# Patient Record
Sex: FEMALE | Race: BLACK OR AFRICAN AMERICAN | NOT HISPANIC OR LATINO | Employment: FULL TIME | URBAN - METROPOLITAN AREA
[De-identification: names, ages, dates, MRNs, and addresses within clinical notes are randomized per-mention and may not be internally consistent; named-entity substitution may affect disease eponyms.]

---

## 2019-07-18 RX ORDER — ESCITALOPRAM OXALATE 20 MG/1
20 TABLET ORAL DAILY
Qty: 30 TABLET | Refills: 3 | Status: SHIPPED | OUTPATIENT
Start: 2019-07-18 | End: 2019-07-29 | Stop reason: SDUPTHER

## 2019-07-29 RX ORDER — CETIRIZINE HYDROCHLORIDE 10 MG/1
10 TABLET ORAL DAILY
Qty: 30 TABLET | Refills: 3 | Status: SHIPPED | OUTPATIENT
Start: 2019-07-29 | End: 2019-12-13 | Stop reason: SDUPTHER

## 2019-07-29 RX ORDER — ATORVASTATIN CALCIUM 20 MG/1
20 TABLET, FILM COATED ORAL DAILY
Qty: 30 TABLET | Refills: 3 | Status: SHIPPED | OUTPATIENT
Start: 2019-07-29 | End: 2019-12-09 | Stop reason: SDUPTHER

## 2019-07-29 RX ORDER — BUMETANIDE 2 MG/1
2 TABLET ORAL DAILY
Qty: 30 TABLET | Refills: 1 | Status: SHIPPED | OUTPATIENT
Start: 2019-07-29 | End: 2019-10-21 | Stop reason: SDUPTHER

## 2019-07-29 RX ORDER — ESCITALOPRAM OXALATE 20 MG/1
20 TABLET ORAL DAILY
Qty: 30 TABLET | Refills: 3 | Status: SHIPPED | OUTPATIENT
Start: 2019-07-29 | End: 2020-01-14 | Stop reason: SDUPTHER

## 2019-07-29 RX ORDER — OMEPRAZOLE 40 MG/1
40 CAPSULE, DELAYED RELEASE ORAL DAILY
Qty: 30 CAPSULE | Refills: 3 | Status: SHIPPED | OUTPATIENT
Start: 2019-07-29 | End: 2019-11-21 | Stop reason: SDUPTHER

## 2019-07-29 RX ORDER — LOSARTAN POTASSIUM 100 MG/1
100 TABLET ORAL DAILY
Qty: 30 TABLET | Refills: 3 | Status: SHIPPED | OUTPATIENT
Start: 2019-07-29 | End: 2020-01-14 | Stop reason: SDUPTHER

## 2019-07-29 RX ORDER — TRIAZOLAM 0.12 MG/1
0.12 TABLET ORAL NIGHTLY PRN
Qty: 30 TABLET | Refills: 0 | Status: SHIPPED | OUTPATIENT
Start: 2019-07-29 | End: 2019-09-17 | Stop reason: SDUPTHER

## 2019-08-08 RX ORDER — LOSARTAN POTASSIUM 50 MG/1
50 TABLET ORAL 2 TIMES DAILY
Qty: 60 TABLET | Refills: 0 | Status: SHIPPED | OUTPATIENT
Start: 2019-08-08 | End: 2019-11-19 | Stop reason: SDUPTHER

## 2019-09-17 DIAGNOSIS — G47.00 INSOMNIA, UNSPECIFIED TYPE: Primary | ICD-10-CM

## 2019-09-19 RX ORDER — TRIAZOLAM 0.12 MG/1
TABLET ORAL
Qty: 30 TABLET | Refills: 0 | Status: SHIPPED | OUTPATIENT
Start: 2019-09-19 | End: 2020-01-14 | Stop reason: SDUPTHER

## 2019-09-23 RX ORDER — TRIAZOLAM 0.12 MG/1
TABLET ORAL
Qty: 30 TABLET | Refills: 0 | OUTPATIENT
Start: 2019-09-23

## 2019-09-24 ENCOUNTER — TELEPHONE (OUTPATIENT)
Dept: FAMILY MEDICINE CLINIC | Facility: CLINIC | Age: 64
End: 2019-09-24

## 2019-10-21 RX ORDER — BUMETANIDE 2 MG/1
TABLET ORAL
Qty: 30 TABLET | Refills: 0 | Status: SHIPPED | OUTPATIENT
Start: 2019-10-21 | End: 2020-01-14 | Stop reason: SDUPTHER

## 2019-11-04 ENCOUNTER — TELEPHONE (OUTPATIENT)
Dept: FAMILY MEDICINE CLINIC | Facility: CLINIC | Age: 64
End: 2019-11-04

## 2019-11-04 RX ORDER — POTASSIUM CHLORIDE 20 MEQ/1
TABLET, EXTENDED RELEASE ORAL
Qty: 30 TABLET | Refills: 1 | Status: SHIPPED | OUTPATIENT
Start: 2019-11-04 | End: 2020-01-13

## 2019-11-19 RX ORDER — LOSARTAN POTASSIUM 50 MG/1
50 TABLET ORAL 2 TIMES DAILY
Qty: 60 TABLET | Refills: 0 | Status: SHIPPED | OUTPATIENT
Start: 2019-11-19 | End: 2020-01-14

## 2019-11-21 ENCOUNTER — TELEPHONE (OUTPATIENT)
Dept: FAMILY MEDICINE CLINIC | Facility: CLINIC | Age: 64
End: 2019-11-21

## 2019-11-21 RX ORDER — OMEPRAZOLE 40 MG/1
40 CAPSULE, DELAYED RELEASE ORAL DAILY
Qty: 30 CAPSULE | Refills: 3 | Status: SHIPPED | OUTPATIENT
Start: 2019-11-21 | End: 2019-11-21 | Stop reason: SDUPTHER

## 2019-11-21 RX ORDER — OMEPRAZOLE 40 MG/1
40 CAPSULE, DELAYED RELEASE ORAL DAILY
Qty: 30 CAPSULE | Refills: 3 | Status: SHIPPED | OUTPATIENT
Start: 2019-11-21 | End: 2020-01-14 | Stop reason: SDUPTHER

## 2019-12-09 ENCOUNTER — TELEPHONE (OUTPATIENT)
Dept: FAMILY MEDICINE CLINIC | Facility: CLINIC | Age: 64
End: 2019-12-09

## 2019-12-09 RX ORDER — ATORVASTATIN CALCIUM 20 MG/1
20 TABLET, FILM COATED ORAL DAILY
Qty: 30 TABLET | Refills: 3 | Status: SHIPPED | OUTPATIENT
Start: 2019-12-09 | End: 2019-12-12 | Stop reason: SDUPTHER

## 2019-12-09 NOTE — TELEPHONE ENCOUNTER
Sent in atorvastatin to [facility] pharmacy listed in chart.

## 2019-12-09 NOTE — TELEPHONE ENCOUNTER
Pt is requesting a refill on the following medication,   atorvastatin (LIPITOR) 20 MG tablet        Sig: Take 1 tablet by mouth Daily.          NEXT APPT 1/14/2020

## 2019-12-12 ENCOUNTER — TELEPHONE (OUTPATIENT)
Dept: FAMILY MEDICINE CLINIC | Facility: CLINIC | Age: 64
End: 2019-12-12

## 2019-12-12 DIAGNOSIS — E78.5 HYPERLIPIDEMIA, UNSPECIFIED HYPERLIPIDEMIA TYPE: Primary | ICD-10-CM

## 2019-12-12 RX ORDER — ATORVASTATIN CALCIUM 20 MG/1
20 TABLET, FILM COATED ORAL DAILY
Qty: 30 TABLET | Refills: 1 | Status: SHIPPED | OUTPATIENT
Start: 2019-12-12 | End: 2019-12-12 | Stop reason: SDUPTHER

## 2019-12-12 RX ORDER — ATORVASTATIN CALCIUM 20 MG/1
20 TABLET, FILM COATED ORAL DAILY
Qty: 90 TABLET | Refills: 1 | Status: SHIPPED | OUTPATIENT
Start: 2019-12-12 | End: 2020-01-14 | Stop reason: SDUPTHER

## 2019-12-12 NOTE — TELEPHONE ENCOUNTER
Patient states [facility] sent a request for her atorvastatin and they told her it was denied because Dr Kebede doesn't know who she is.  She called kind of upset and wants to know why.  I tried to explain that she hasn't been to the office yet and that's probably why, but she didn't really want to hear that.  She does have an appointment on 01/14.  She wants to know if she could get enough to her appointment and if someone could call her please.  Her phone number is [phone].  Pharmacy is [facility].

## 2019-12-16 RX ORDER — CETIRIZINE HYDROCHLORIDE 10 MG/1
TABLET ORAL
Qty: 30 TABLET | Refills: 3 | Status: SHIPPED | OUTPATIENT
Start: 2019-12-16 | End: 2020-01-14 | Stop reason: SDUPTHER

## 2020-01-11 RX ORDER — VARENICLINE TARTRATE 1 MG/1
1 TABLET, FILM COATED ORAL 2 TIMES DAILY
COMMUNITY
End: 2020-01-14 | Stop reason: SDUPTHER

## 2020-01-13 RX ORDER — POTASSIUM CHLORIDE 20 MEQ/1
TABLET, EXTENDED RELEASE ORAL
Qty: 30 TABLET | Refills: 1 | Status: SHIPPED | OUTPATIENT
Start: 2020-01-13 | End: 2020-01-14 | Stop reason: SDUPTHER

## 2020-01-14 ENCOUNTER — OFFICE VISIT (OUTPATIENT)
Dept: FAMILY MEDICINE CLINIC | Facility: CLINIC | Age: 65
End: 2020-01-14

## 2020-01-14 VITALS
DIASTOLIC BLOOD PRESSURE: 82 MMHG | HEART RATE: 83 BPM | TEMPERATURE: 98.6 F | BODY MASS INDEX: 23.56 KG/M2 | WEIGHT: 141.38 LBS | SYSTOLIC BLOOD PRESSURE: 130 MMHG | OXYGEN SATURATION: 98 % | HEIGHT: 65 IN

## 2020-01-14 DIAGNOSIS — J30.1 ALLERGIC RHINITIS DUE TO POLLEN, UNSPECIFIED SEASONALITY: ICD-10-CM

## 2020-01-14 DIAGNOSIS — Z00.00 HEALTHCARE MAINTENANCE: Primary | ICD-10-CM

## 2020-01-14 DIAGNOSIS — K21.9 GASTROESOPHAGEAL REFLUX DISEASE WITHOUT ESOPHAGITIS: ICD-10-CM

## 2020-01-14 DIAGNOSIS — G47.00 INSOMNIA, UNSPECIFIED TYPE: ICD-10-CM

## 2020-01-14 DIAGNOSIS — I10 ESSENTIAL HYPERTENSION: ICD-10-CM

## 2020-01-14 DIAGNOSIS — F32.A DEPRESSION, UNSPECIFIED DEPRESSION TYPE: ICD-10-CM

## 2020-01-14 DIAGNOSIS — Z11.59 ENCOUNTER FOR HEPATITIS C SCREENING TEST FOR LOW RISK PATIENT: ICD-10-CM

## 2020-01-14 DIAGNOSIS — F17.200 SMOKING: ICD-10-CM

## 2020-01-14 DIAGNOSIS — Z01.419 ENCOUNTER FOR GYNECOLOGICAL EXAMINATION WITHOUT ABNORMAL FINDING: ICD-10-CM

## 2020-01-14 DIAGNOSIS — R60.9 EDEMA, UNSPECIFIED TYPE: ICD-10-CM

## 2020-01-14 DIAGNOSIS — E78.5 HYPERLIPIDEMIA, UNSPECIFIED HYPERLIPIDEMIA TYPE: ICD-10-CM

## 2020-01-14 PROCEDURE — 90732 PPSV23 VACC 2 YRS+ SUBQ/IM: CPT | Performed by: FAMILY MEDICINE

## 2020-01-14 PROCEDURE — 90471 IMMUNIZATION ADMIN: CPT | Performed by: FAMILY MEDICINE

## 2020-01-14 PROCEDURE — 90686 IIV4 VACC NO PRSV 0.5 ML IM: CPT | Performed by: FAMILY MEDICINE

## 2020-01-14 PROCEDURE — 90472 IMMUNIZATION ADMIN EACH ADD: CPT | Performed by: FAMILY MEDICINE

## 2020-01-14 PROCEDURE — 99386 PREV VISIT NEW AGE 40-64: CPT | Performed by: FAMILY MEDICINE

## 2020-01-14 RX ORDER — ATORVASTATIN CALCIUM 20 MG/1
20 TABLET, FILM COATED ORAL DAILY
Qty: 90 TABLET | Refills: 3 | Status: SHIPPED | OUTPATIENT
Start: 2020-01-14 | End: 2020-04-27

## 2020-01-14 RX ORDER — LOSARTAN POTASSIUM 100 MG/1
100 TABLET ORAL DAILY
Qty: 30 TABLET | Refills: 3 | Status: SHIPPED | OUTPATIENT
Start: 2020-01-14 | End: 2020-04-27 | Stop reason: SDUPTHER

## 2020-01-14 RX ORDER — OMEPRAZOLE 40 MG/1
40 CAPSULE, DELAYED RELEASE ORAL DAILY
Qty: 30 CAPSULE | Refills: 3 | Status: SHIPPED | OUTPATIENT
Start: 2020-01-14 | End: 2020-06-15

## 2020-01-14 RX ORDER — ESCITALOPRAM OXALATE 20 MG/1
20 TABLET ORAL DAILY
Qty: 30 TABLET | Refills: 3 | Status: SHIPPED | OUTPATIENT
Start: 2020-01-14 | End: 2020-04-27 | Stop reason: SDUPTHER

## 2020-01-14 RX ORDER — POTASSIUM CHLORIDE 20 MEQ/1
20 TABLET, EXTENDED RELEASE ORAL DAILY
Qty: 30 TABLET | Refills: 1 | Status: SHIPPED | OUTPATIENT
Start: 2020-01-14 | End: 2020-04-08 | Stop reason: SDUPTHER

## 2020-01-14 RX ORDER — NITROGLYCERIN 0.4 MG/1
TABLET SUBLINGUAL
COMMUNITY
Start: 2013-03-28 | End: 2020-01-14

## 2020-01-14 RX ORDER — CETIRIZINE HYDROCHLORIDE 10 MG/1
10 TABLET ORAL DAILY
Qty: 30 TABLET | Refills: 11 | Status: SHIPPED | OUTPATIENT
Start: 2020-01-14 | End: 2021-01-26

## 2020-01-14 RX ORDER — AMOXICILLIN 500 MG
2 CAPSULE ORAL 2 TIMES DAILY WITH MEALS
Qty: 120 CAPSULE | Refills: 11 | Status: SHIPPED | OUTPATIENT
Start: 2020-01-14 | End: 2022-09-21 | Stop reason: SDUPTHER

## 2020-01-14 RX ORDER — AMOXICILLIN 500 MG
2 CAPSULE ORAL DAILY
COMMUNITY
Start: 2014-06-16 | End: 2020-01-14 | Stop reason: SDUPTHER

## 2020-01-14 RX ORDER — BUMETANIDE 2 MG/1
2 TABLET ORAL DAILY
Qty: 30 TABLET | Refills: 11 | Status: SHIPPED | OUTPATIENT
Start: 2020-01-14 | End: 2020-04-08

## 2020-01-14 RX ORDER — VARENICLINE TARTRATE 1 MG/1
1 TABLET, FILM COATED ORAL 2 TIMES DAILY
Qty: 60 TABLET | Refills: 4 | Status: SHIPPED | OUTPATIENT
Start: 2020-01-14 | End: 2020-08-13

## 2020-01-14 NOTE — PATIENT INSTRUCTIONS
Health Maintenance, Female  Adopting a healthy lifestyle and getting preventive care can go a long way to promote health and wellness. Talk with your health care provider about what schedule of regular examinations is right for you. This is a good chance for you to check in with your provider about disease prevention and staying healthy.  In between checkups, there are plenty of things you can do on your own. Experts have done a lot of research about which lifestyle changes and preventive measures are most likely to keep you healthy. Ask your health care provider for more information.  Weight and diet  Eat a healthy diet  · Be sure to include plenty of vegetables, fruits, low-fat dairy products, and lean protein.  · Do not eat a lot of foods high in solid fats, added sugars, or salt.  · Get regular exercise. This is one of the most important things you can do for your health.  ? Most adults should exercise for at least 150 minutes each week. The exercise should increase your heart rate and make you sweat (moderate-intensity exercise).  ? Most adults should also do strengthening exercises at least twice a week. This is in addition to the moderate-intensity exercise.  Maintain a healthy weight  · Body mass index (BMI) is a measurement that can be used to identify possible weight problems. It estimates body fat based on height and weight. Your health care provider can help determine your BMI and help you achieve or maintain a healthy weight.  · For females 20 years of age and older:  ? A BMI below 18.5 is considered underweight.  ? A BMI of 18.5 to 24.9 is normal.  ? A BMI of 25 to 29.9 is considered overweight.  ? A BMI of 30 and above is considered obese.  Watch levels of cholesterol and blood lipids  · You should start having your blood tested for lipids and cholesterol at 20 years of age, then have this test every 5 years.  · You may need to have your cholesterol levels checked more often if:  ? Your lipid or  cholesterol levels are high.  ? You are older than 50 years of age.  ? You are at high risk for heart disease.  Cancer screening  Lung Cancer  · Lung cancer screening is recommended for adults 55-80 years old who are at high risk for lung cancer because of a history of smoking.  · A yearly low-dose CT scan of the lungs is recommended for people who:  ? Currently smoke.  ? Have quit within the past 15 years.  ? Have at least a 30-pack-year history of smoking. A pack year is smoking an average of one pack of cigarettes a day for 1 year.  · Yearly screening should continue until it has been 15 years since you quit.  · Yearly screening should stop if you develop a health problem that would prevent you from having lung cancer treatment.  Breast Cancer  · Practice breast self-awareness. This means understanding how your breasts normally appear and feel.  · It also means doing regular breast self-exams. Let your health care provider know about any changes, no matter how small.  · If you are in your 20s or 30s, you should have a clinical breast exam (CBE) by a health care provider every 1-3 years as part of a regular health exam.  · If you are 40 or older, have a CBE every year. Also consider having a breast X-ray (mammogram) every year.  · If you have a family history of breast cancer, talk to your health care provider about genetic screening.  · If you are at high risk for breast cancer, talk to your health care provider about having an MRI and a mammogram every year.  · Breast cancer gene (BRCA) assessment is recommended for women who have family members with BRCA-related cancers. BRCA-related cancers include:  ? Breast.  ? Ovarian.  ? Tubal.  ? Peritoneal cancers.  · Results of the assessment will determine the need for genetic counseling and BRCA1 and BRCA2 testing.  Cervical Cancer  Your health care provider may recommend that you be screened regularly for cancer of the pelvic organs (ovaries, uterus, and vagina).  This screening involves a pelvic examination, including checking for microscopic changes to the surface of your cervix (Pap test). You may be encouraged to have this screening done every 3 years, beginning at age 21.  · For women ages 30-65, health care providers may recommend pelvic exams and Pap testing every 3 years, or they may recommend the Pap and pelvic exam, combined with testing for human papilloma virus (HPV), every 5 years. Some types of HPV increase your risk of cervical cancer. Testing for HPV may also be done on women of any age with unclear Pap test results.  · Other health care providers may not recommend any screening for nonpregnant women who are considered low risk for pelvic cancer and who do not have symptoms. Ask your health care provider if a screening pelvic exam is right for you.  · If you have had past treatment for cervical cancer or a condition that could lead to cancer, you need Pap tests and screening for cancer for at least 20 years after your treatment. If Pap tests have been discontinued, your risk factors (such as having a new sexual partner) need to be reassessed to determine if screening should resume. Some women have medical problems that increase the chance of getting cervical cancer. In these cases, your health care provider may recommend more frequent screening and Pap tests.  Colorectal Cancer  · This type of cancer can be detected and often prevented.  · Routine colorectal cancer screening usually begins at 50 years of age and continues through 75 years of age.  · Your health care provider may recommend screening at an earlier age if you have risk factors for colon cancer.  · Your health care provider may also recommend using home test kits to check for hidden blood in the stool.  · A small camera at the end of a tube can be used to examine your colon directly (sigmoidoscopy or colonoscopy). This is done to check for the earliest forms of colorectal cancer.  · Routine  screening usually begins at age 50.  · Direct examination of the colon should be repeated every 5-10 years through 75 years of age. However, you may need to be screened more often if early forms of precancerous polyps or small growths are found.  Skin Cancer  · Check your skin from head to toe regularly.  · Tell your health care provider about any new moles or changes in moles, especially if there is a change in a mole's shape or color.  · Also tell your health care provider if you have a mole that is larger than the size of a pencil eraser.  · Always use sunscreen. Apply sunscreen liberally and repeatedly throughout the day.  · Protect yourself by wearing long sleeves, pants, a wide-brimmed hat, and sunglasses whenever you are outside.  Heart disease, diabetes, and high blood pressure  · High blood pressure causes heart disease and increases the risk of stroke. High blood pressure is more likely to develop in:  ? People who have blood pressure in the high end of the normal range (130-139/85-89 mm Hg).  ? People who are overweight or obese.  ? People who are .  · If you are 18-39 years of age, have your blood pressure checked every 3-5 years. If you are 40 years of age or older, have your blood pressure checked every year. You should have your blood pressure measured twice--once when you are at a hospital or clinic, and once when you are not at a hospital or clinic. Record the average of the two measurements. To check your blood pressure when you are not at a hospital or clinic, you can use:  ? An automated blood pressure machine at a pharmacy.  ? A home blood pressure monitor.  · If you are between 55 years and 79 years old, ask your health care provider if you should take aspirin to prevent strokes.  · Have regular diabetes screenings. This involves taking a blood sample to check your fasting blood sugar level.  ? If you are at a normal weight and have a low risk for diabetes, have this test once  every three years after 45 years of age.  ? If you are overweight and have a high risk for diabetes, consider being tested at a younger age or more often.  Preventing infection  Hepatitis B  · If you have a higher risk for hepatitis B, you should be screened for this virus. You are considered at high risk for hepatitis B if:  ? You were born in a country where hepatitis B is common. Ask your health care provider which countries are considered high risk.  ? Your parents were born in a high-risk country, and you have not been immunized against hepatitis B (hepatitis B vaccine).  ? You have HIV or AIDS.  ? You use needles to inject street drugs.  ? You live with someone who has hepatitis B.  ? You have had sex with someone who has hepatitis B.  ? You get hemodialysis treatment.  ? You take certain medicines for conditions, including cancer, organ transplantation, and autoimmune conditions.  Hepatitis C  · Blood testing is recommended for:  ? Everyone born from 1945 through 1965.  ? Anyone with known risk factors for hepatitis C.  Sexually transmitted infections (STIs)  · You should be screened for sexually transmitted infections (STIs) including gonorrhea and chlamydia if:  ? You are sexually active and are younger than 24 years of age.  ? You are older than 24 years of age and your health care provider tells you that you are at risk for this type of infection.  ? Your sexual activity has changed since you were last screened and you are at an increased risk for chlamydia or gonorrhea. Ask your health care provider if you are at risk.  · If you do not have HIV, but are at risk, it may be recommended that you take a prescription medicine daily to prevent HIV infection. This is called pre-exposure prophylaxis (PrEP). You are considered at risk if:  ? You are sexually active and do not regularly use condoms or know the HIV status of your partner(s).  ? You take drugs by injection.  ? You are sexually active with a partner  who has HIV.  Talk with your health care provider about whether you are at high risk of being infected with HIV. If you choose to begin PrEP, you should first be tested for HIV. You should then be tested every 3 months for as long as you are taking PrEP.  Pregnancy  · If you are premenopausal and you may become pregnant, ask your health care provider about preconception counseling.  · If you may become pregnant, take 400 to 800 micrograms (mcg) of folic acid every day.  · If you want to prevent pregnancy, talk to your health care provider about birth control (contraception).  Osteoporosis and menopause  · Osteoporosis is a disease in which the bones lose minerals and strength with aging. This can result in serious bone fractures. Your risk for osteoporosis can be identified using a bone density scan.  · If you are 65 years of age or older, or if you are at risk for osteoporosis and fractures, ask your health care provider if you should be screened.  · Ask your health care provider whether you should take a calcium or vitamin D supplement to lower your risk for osteoporosis.  · Menopause may have certain physical symptoms and risks.  · Hormone replacement therapy may reduce some of these symptoms and risks.  Talk to your health care provider about whether hormone replacement therapy is right for you.  Follow these instructions at home:  · Schedule regular health, dental, and eye exams.  · Stay current with your immunizations.  · Do not use any tobacco products including cigarettes, chewing tobacco, or electronic cigarettes.  · If you are pregnant, do not drink alcohol.  · If you are breastfeeding, limit how much and how often you drink alcohol.  · Limit alcohol intake to no more than 1 drink per day for nonpregnant women. One drink equals 12 ounces of beer, 5 ounces of wine, or 1½ ounces of hard liquor.  · Do not use street drugs.  · Do not share needles.  · Ask your health care provider for help if you need support  or information about quitting drugs.  · Tell your health care provider if you often feel depressed.  · Tell your health care provider if you have ever been abused or do not feel safe at home.  This information is not intended to replace advice given to you by your health care provider. Make sure you discuss any questions you have with your health care provider.  Document Released: 07/02/2012 Document Revised: 05/25/2017 Document Reviewed: 09/20/2016  [employer] Interactive Patient Education © 2019 [employer]

## 2020-01-14 NOTE — PROGRESS NOTES
"Karen Arora is here today for an annual physical exam.     Eating a healthy diet. Exercising routinely. Yes  no periods. Wears seat belt. Feels safe at home.   Sexual activity: yes  Birth control:n/a  Pregnancy:2  Sexual and gender orientation:heterosexual    PHQ-2 Depression Screening  Little interest or pleasure in doing things? 0   Feeling down, depressed, or hopeless? 0   PHQ-2 Total Score 0         I have reviewed the patient's medical, family, and social history in detail and updated the computerized patient record.    Screening history:  Colonoscopy - 2014  Mammogram- due, Pap/pelvic - today  Metabolic - NL    Health Maintenance   Topic Date Due   • TDAP/TD VACCINES (1 - Tdap) 08/24/1968   • PNEUMOCOCCAL VACCINE (19-64 MEDIUM RISK) (1 of 1 - PPSV23) 08/24/1976   • ZOSTER VACCINE (1 of 2) 08/24/2007   • HEPATITIS C SCREENING  07/18/2019   • INFLUENZA VACCINE  08/01/2019   • LIPID PANEL  11/04/2019   • ANNUAL PHYSICAL  01/15/2021   • MAMMOGRAM  01/28/2021   • PAP SMEAR  01/14/2023   • COLONOSCOPY  07/18/2024       Review of Systems   All other systems reviewed and are negative.      /82   Pulse 83   Temp 98.6 °F (37 °C)   Ht 165.1 cm (65\")   Wt 64.1 kg (141 lb 6 oz)   SpO2 98%   BMI 23.53 kg/m²      Physical Exam      Physical Exam   Constitutional: She appears well-developed and well-nourished. No distress.   HENT:   Head: Normocephalic.   Eyes: Pupils are equal, round, and reactive to light.   Cardiovascular: Normal rate and regular rhythm.   Pulmonary/Chest: Effort normal and breath sounds normal. No respiratory distress. She has no wheezes.   Abdominal: Soft.   Genitourinary: Vagina normal.   Musculoskeletal: Normal range of motion.   Skin: Skin is warm. She is not diaphoretic.   Nursing note and vitals reviewed.    No visits with results within 2 Week(s) from this visit.   Latest known visit with results is:   Abstract on 10/17/2019   Component Date Value Ref Range Status   • HM " Mammogram 01/28/2019 See Report   Final   • HM Colonoscopy 07/18/2014 See Report   Final         Current Outpatient Medications:   •  atorvastatin (LIPITOR) 20 MG tablet, Take 1 tablet by mouth Daily., Disp: 90 tablet, Rfl: 1  •  bumetanide (BUMEX) 2 MG tablet, TAKE ONE TABLET BY MOUTH DAILY, Disp: 30 tablet, Rfl: 0  •  cetirizine (zyrTEC) 10 MG tablet, TAKE ONE TABLET BY MOUTH DAILY, Disp: 30 tablet, Rfl: 3  •  escitalopram (LEXAPRO) 20 MG tablet, Take 1 tablet by mouth Daily., Disp: 30 tablet, Rfl: 3  •  losartan (COZAAR) 100 MG tablet, Take 1 tablet by mouth Daily., Disp: 30 tablet, Rfl: 3  •  Omega-3 Fatty Acids (FISH OIL) 1200 MG capsule capsule, Take 2 capsules by mouth Daily., Disp: , Rfl:   •  omeprazole (priLOSEC) 40 MG capsule, Take 1 capsule by mouth Daily., Disp: 30 capsule, Rfl: 3  •  potassium chloride (K-DUR,KLOR-CON) 20 MEQ CR tablet, TAKE ONE TABLET BY MOUTH ONCE DAILY, Disp: 30 tablet, Rfl: 1  •  triazolam (HALCION) 0.125 MG tablet, TAKE ONE TABLET BY MOUTH ONCE NIGHTLY AS NEEDED FOR SLEEP, Disp: 30 tablet, Rfl: 0  •  varenicline (CHANTIX CONTINUING MONTH BRANDON) 1 MG tablet, Take 1 mg by mouth 2 (Two) Times a Day., Disp: , Rfl:     Karen was seen today for annual exam and gynecologic exam.    Diagnoses and all orders for this visit:    Healthcare maintenance  -     Fluarix/Fluzone/Afluria Quad>6 Months  -     Comprehensive Metabolic Panel  -     Lipid Panel  -     CBC & Differential  -     Mammo Screening Bilateral With CAD; Future    Encounter for gynecological examination without abnormal finding      Preventive guidance given.  Encourage healthy diet and exercise.  Encourage patient to stay up to date on screening examinations as indicated based on age and risk factors. F/U yearly.

## 2020-01-15 LAB
ALBUMIN SERPL-MCNC: 4.6 G/DL (ref 3.5–5.2)
ALBUMIN/GLOB SERPL: 1.8 G/DL
ALP SERPL-CCNC: 58 U/L (ref 39–117)
ALT SERPL-CCNC: 21 U/L (ref 1–33)
AST SERPL-CCNC: 22 U/L (ref 1–32)
BASOPHILS # BLD AUTO: 0.06 10*3/MM3 (ref 0–0.2)
BASOPHILS NFR BLD AUTO: 0.9 % (ref 0–1.5)
BILIRUB SERPL-MCNC: 0.2 MG/DL (ref 0.2–1.2)
BUN SERPL-MCNC: 17 MG/DL (ref 8–23)
BUN/CREAT SERPL: 19.1 (ref 7–25)
CALCIUM SERPL-MCNC: 10 MG/DL (ref 8.6–10.5)
CHLORIDE SERPL-SCNC: 99 MMOL/L (ref 98–107)
CHOLEST SERPL-MCNC: 226 MG/DL (ref 0–200)
CO2 SERPL-SCNC: 27.2 MMOL/L (ref 22–29)
CREAT SERPL-MCNC: 0.89 MG/DL (ref 0.57–1)
EOSINOPHIL # BLD AUTO: 0.07 10*3/MM3 (ref 0–0.4)
EOSINOPHIL NFR BLD AUTO: 1.1 % (ref 0.3–6.2)
ERYTHROCYTE [DISTWIDTH] IN BLOOD BY AUTOMATED COUNT: 12.2 % (ref 12.3–15.4)
GLOBULIN SER CALC-MCNC: 2.5 GM/DL
GLUCOSE SERPL-MCNC: 97 MG/DL (ref 65–99)
HCT VFR BLD AUTO: 37.3 % (ref 34–46.6)
HCV AB S/CO SERPL IA: 0.3 S/CO RATIO (ref 0–0.9)
HDLC SERPL-MCNC: 86 MG/DL (ref 40–60)
HGB BLD-MCNC: 12 G/DL (ref 12–15.9)
IMM GRANULOCYTES # BLD AUTO: 0.02 10*3/MM3 (ref 0–0.05)
IMM GRANULOCYTES NFR BLD AUTO: 0.3 % (ref 0–0.5)
LDLC SERPL CALC-MCNC: 123 MG/DL (ref 0–100)
LYMPHOCYTES # BLD AUTO: 1.82 10*3/MM3 (ref 0.7–3.1)
LYMPHOCYTES NFR BLD AUTO: 27.5 % (ref 19.6–45.3)
MCH RBC QN AUTO: 30.2 PG (ref 26.6–33)
MCHC RBC AUTO-ENTMCNC: 32.2 G/DL (ref 31.5–35.7)
MCV RBC AUTO: 94 FL (ref 79–97)
MONOCYTES # BLD AUTO: 0.74 10*3/MM3 (ref 0.1–0.9)
MONOCYTES NFR BLD AUTO: 11.2 % (ref 5–12)
NEUTROPHILS # BLD AUTO: 3.9 10*3/MM3 (ref 1.7–7)
NEUTROPHILS NFR BLD AUTO: 59 % (ref 42.7–76)
NRBC BLD AUTO-RTO: 0.2 /100 WBC (ref 0–0.2)
PLATELET # BLD AUTO: 321 10*3/MM3 (ref 140–450)
POTASSIUM SERPL-SCNC: 4.8 MMOL/L (ref 3.5–5.2)
PROT SERPL-MCNC: 7.1 G/DL (ref 6–8.5)
RBC # BLD AUTO: 3.97 10*6/MM3 (ref 3.77–5.28)
SODIUM SERPL-SCNC: 139 MMOL/L (ref 136–145)
TRIGL SERPL-MCNC: 84 MG/DL (ref 0–150)
VLDLC SERPL CALC-MCNC: 16.8 MG/DL
WBC # BLD AUTO: 6.61 10*3/MM3 (ref 3.4–10.8)

## 2020-02-03 ENCOUNTER — TELEPHONE (OUTPATIENT)
Dept: FAMILY MEDICINE CLINIC | Facility: CLINIC | Age: 65
End: 2020-02-03

## 2020-02-03 DIAGNOSIS — G47.00 INSOMNIA, UNSPECIFIED TYPE: ICD-10-CM

## 2020-02-03 NOTE — TELEPHONE ENCOUNTER
Patient states that Dr Kebede increased her Triazolam to .25 mg.  The instructions states to take half a tablet and not a whole tablet which she says she is supposed to be taking.  She says [facility] won't refill the medications since the instructions have changed.  She wants to know if you could send to [facility] on file please.  Phone number is [phone].

## 2020-02-26 ENCOUNTER — HOSPITAL ENCOUNTER (OUTPATIENT)
Dept: MAMMOGRAPHY | Facility: HOSPITAL | Age: 65
Discharge: HOME | End: 2020-02-26

## 2020-02-26 ENCOUNTER — HOSPITAL ENCOUNTER (OUTPATIENT)
Dept: MAMMOGRAPHY | Facility: HOSPITAL | Age: 65
Discharge: HOME | End: 2020-02-26
Admitting: FAMILY MEDICINE

## 2020-02-26 DIAGNOSIS — Z00.00 HEALTHCARE MAINTENANCE: ICD-10-CM

## 2020-02-26 PROCEDURE — 77067 SCR MAMMO BI INCL CAD: CPT

## 2020-02-26 PROCEDURE — 77063 BREAST TOMOSYNTHESIS BI: CPT

## 2020-03-05 DIAGNOSIS — G47.00 INSOMNIA, UNSPECIFIED TYPE: ICD-10-CM

## 2020-03-05 NOTE — TELEPHONE ENCOUNTER
Thyroid was not checked.  We can call in Triazolam, but it may not be covered by insurance since it is not due, will be cash pay.

## 2020-03-05 NOTE — TELEPHONE ENCOUNTER
Patient called and states she left her triazolam in [place] when she went there for her brother's .  She said the pharmacy told her she can't get a refill for 15 more days.  She wants to know if you could send another prescription in for her please.  Also, she wants to know if her thyroid levels were checked when she was in last. She has a copy of her labs, but can't tell if it was done or not?  Her phone number is [phone].

## 2020-03-06 DIAGNOSIS — R53.83 OTHER FATIGUE: Primary | ICD-10-CM

## 2020-03-07 DIAGNOSIS — G47.00 INSOMNIA, UNSPECIFIED TYPE: ICD-10-CM

## 2020-03-26 ENCOUNTER — TELEPHONE (OUTPATIENT)
Dept: FAMILY MEDICINE CLINIC | Facility: CLINIC | Age: 65
End: 2020-03-26

## 2020-03-26 DIAGNOSIS — G47.00 INSOMNIA, UNSPECIFIED TYPE: Primary | ICD-10-CM

## 2020-03-26 RX ORDER — TEMAZEPAM 30 MG/1
30 CAPSULE ORAL NIGHTLY PRN
Qty: 30 CAPSULE | Refills: 1 | Status: SHIPPED | OUTPATIENT
Start: 2020-03-26 | End: 2020-06-30 | Stop reason: SDUPTHER

## 2020-04-07 LAB
T4 FREE SERPL-MCNC: 0.92 NG/DL (ref 0.93–1.7)
TSH SERPL DL<=0.005 MIU/L-ACNC: 0.36 UIU/ML (ref 0.27–4.2)

## 2020-04-08 DIAGNOSIS — I10 ESSENTIAL HYPERTENSION: ICD-10-CM

## 2020-04-08 DIAGNOSIS — R60.9 EDEMA, UNSPECIFIED TYPE: ICD-10-CM

## 2020-04-08 RX ORDER — POTASSIUM CHLORIDE 20 MEQ/1
20 TABLET, EXTENDED RELEASE ORAL DAILY
Qty: 30 TABLET | Refills: 1 | Status: SHIPPED | OUTPATIENT
Start: 2020-04-08 | End: 2020-06-15 | Stop reason: SDUPTHER

## 2020-04-08 RX ORDER — BUMETANIDE 2 MG/1
TABLET ORAL
Qty: 15 TABLET | Refills: 0 | Status: SHIPPED | OUTPATIENT
Start: 2020-04-08 | End: 2020-04-27 | Stop reason: SDUPTHER

## 2020-04-25 DIAGNOSIS — E78.5 HYPERLIPIDEMIA, UNSPECIFIED HYPERLIPIDEMIA TYPE: ICD-10-CM

## 2020-04-27 DIAGNOSIS — I10 ESSENTIAL HYPERTENSION: ICD-10-CM

## 2020-04-27 DIAGNOSIS — F32.A DEPRESSION, UNSPECIFIED DEPRESSION TYPE: ICD-10-CM

## 2020-04-27 DIAGNOSIS — R60.9 EDEMA, UNSPECIFIED TYPE: ICD-10-CM

## 2020-04-27 RX ORDER — BUMETANIDE 2 MG/1
2 TABLET ORAL DAILY
Qty: 15 TABLET | Refills: 0 | Status: SHIPPED | OUTPATIENT
Start: 2020-04-27 | End: 2020-06-08

## 2020-04-27 RX ORDER — ATORVASTATIN CALCIUM 20 MG/1
TABLET, FILM COATED ORAL
Qty: 90 TABLET | Refills: 0 | Status: SHIPPED | OUTPATIENT
Start: 2020-04-27 | End: 2020-07-07 | Stop reason: SDUPTHER

## 2020-04-27 RX ORDER — LOSARTAN POTASSIUM 100 MG/1
100 TABLET ORAL DAILY
Qty: 30 TABLET | Refills: 3 | Status: SHIPPED | OUTPATIENT
Start: 2020-04-27 | End: 2020-05-07

## 2020-04-27 RX ORDER — ESCITALOPRAM OXALATE 20 MG/1
20 TABLET ORAL DAILY
Qty: 30 TABLET | Refills: 3 | Status: SHIPPED | OUTPATIENT
Start: 2020-04-27 | End: 2020-07-07 | Stop reason: SDUPTHER

## 2020-05-07 DIAGNOSIS — I10 ESSENTIAL HYPERTENSION: ICD-10-CM

## 2020-05-07 RX ORDER — LOSARTAN POTASSIUM 100 MG/1
TABLET ORAL
Qty: 30 TABLET | Refills: 0 | Status: SHIPPED | OUTPATIENT
Start: 2020-05-07 | End: 2020-06-02

## 2020-06-02 DIAGNOSIS — I10 ESSENTIAL HYPERTENSION: ICD-10-CM

## 2020-06-02 RX ORDER — LOSARTAN POTASSIUM 100 MG/1
TABLET ORAL
Qty: 30 TABLET | Refills: 0 | Status: SHIPPED | OUTPATIENT
Start: 2020-06-02 | End: 2020-07-07 | Stop reason: SDUPTHER

## 2020-06-06 DIAGNOSIS — R60.9 EDEMA, UNSPECIFIED TYPE: ICD-10-CM

## 2020-06-08 RX ORDER — BUMETANIDE 2 MG/1
TABLET ORAL
Qty: 15 TABLET | Refills: 0 | Status: SHIPPED | OUTPATIENT
Start: 2020-06-08 | End: 2020-06-15

## 2020-06-14 DIAGNOSIS — R60.9 EDEMA, UNSPECIFIED TYPE: ICD-10-CM

## 2020-06-14 DIAGNOSIS — K21.9 GASTROESOPHAGEAL REFLUX DISEASE WITHOUT ESOPHAGITIS: ICD-10-CM

## 2020-06-15 DIAGNOSIS — I10 ESSENTIAL HYPERTENSION: ICD-10-CM

## 2020-06-15 DIAGNOSIS — K21.9 GASTROESOPHAGEAL REFLUX DISEASE WITHOUT ESOPHAGITIS: ICD-10-CM

## 2020-06-15 DIAGNOSIS — R60.9 EDEMA, UNSPECIFIED TYPE: ICD-10-CM

## 2020-06-15 RX ORDER — POTASSIUM CHLORIDE 20 MEQ/1
20 TABLET, EXTENDED RELEASE ORAL DAILY
Qty: 30 TABLET | Refills: 1 | Status: SHIPPED | OUTPATIENT
Start: 2020-06-15 | End: 2020-07-07 | Stop reason: SDUPTHER

## 2020-06-15 RX ORDER — OMEPRAZOLE 40 MG/1
40 CAPSULE, DELAYED RELEASE ORAL DAILY
Qty: 30 CAPSULE | Refills: 3 | Status: SHIPPED | OUTPATIENT
Start: 2020-06-15 | End: 2020-07-07 | Stop reason: SDUPTHER

## 2020-06-15 RX ORDER — OMEPRAZOLE 40 MG/1
CAPSULE, DELAYED RELEASE ORAL
Qty: 30 CAPSULE | Refills: 2 | Status: SHIPPED | OUTPATIENT
Start: 2020-06-15 | End: 2020-07-07

## 2020-06-15 RX ORDER — POTASSIUM CHLORIDE 20 MEQ/1
20 TABLET, EXTENDED RELEASE ORAL DAILY
Qty: 30 TABLET | Refills: 1 | Status: SHIPPED | OUTPATIENT
Start: 2020-06-15 | End: 2020-06-15 | Stop reason: SDUPTHER

## 2020-06-15 RX ORDER — BUMETANIDE 2 MG/1
TABLET ORAL
Qty: 15 TABLET | Refills: 0 | Status: SHIPPED | OUTPATIENT
Start: 2020-06-15 | End: 2020-07-07

## 2020-06-15 RX ORDER — BUMETANIDE 2 MG/1
2 TABLET ORAL DAILY
Qty: 15 TABLET | Refills: 0 | Status: SHIPPED | OUTPATIENT
Start: 2020-06-15 | End: 2020-07-07 | Stop reason: SDUPTHER

## 2020-06-15 RX ORDER — OMEPRAZOLE 40 MG/1
40 CAPSULE, DELAYED RELEASE ORAL DAILY
Qty: 30 CAPSULE | Refills: 3 | Status: SHIPPED | OUTPATIENT
Start: 2020-06-15 | End: 2020-06-15 | Stop reason: SDUPTHER

## 2020-06-15 NOTE — TELEPHONE ENCOUNTER
PATIENT REQUESTED A REFILL ON:bumetanide (BUMEX) 2 MG tablet  omeprazole (priLOSEC) 40 MG capsule  potassium chloride (K-DUR,KLOR-CON) 20 MEQ CR tablet    PATIENT CAN BE REACHED ON:[phone]    PHARMACY PREFERRED:[facility] - [place], [place] - [address] AT [address]  - [phone]  - [phone] FX

## 2020-06-16 DIAGNOSIS — G47.00 INSOMNIA, UNSPECIFIED TYPE: ICD-10-CM

## 2020-06-16 RX ORDER — TEMAZEPAM 30 MG/1
30 CAPSULE ORAL NIGHTLY PRN
Qty: 30 CAPSULE | Refills: 0 | OUTPATIENT
Start: 2020-06-16

## 2020-06-26 DIAGNOSIS — G47.00 INSOMNIA, UNSPECIFIED TYPE: ICD-10-CM

## 2020-06-26 DIAGNOSIS — F17.200 SMOKING: ICD-10-CM

## 2020-06-26 RX ORDER — TEMAZEPAM 30 MG/1
30 CAPSULE ORAL NIGHTLY PRN
Qty: 30 CAPSULE | Refills: 1 | OUTPATIENT
Start: 2020-06-26

## 2020-06-30 ENCOUNTER — TELEPHONE (OUTPATIENT)
Dept: FAMILY MEDICINE CLINIC | Facility: CLINIC | Age: 65
End: 2020-06-30

## 2020-06-30 DIAGNOSIS — G47.00 INSOMNIA, UNSPECIFIED TYPE: ICD-10-CM

## 2020-06-30 RX ORDER — TEMAZEPAM 30 MG/1
30 CAPSULE ORAL NIGHTLY PRN
Qty: 30 CAPSULE | Refills: 1 | Status: CANCELLED | OUTPATIENT
Start: 2020-06-30

## 2020-06-30 RX ORDER — TEMAZEPAM 30 MG/1
30 CAPSULE ORAL NIGHTLY PRN
Qty: 30 CAPSULE | Refills: 0 | Status: SHIPPED | OUTPATIENT
Start: 2020-06-30 | End: 2020-06-30 | Stop reason: SDUPTHER

## 2020-06-30 RX ORDER — TEMAZEPAM 30 MG/1
30 CAPSULE ORAL NIGHTLY PRN
Qty: 30 CAPSULE | Refills: 0 | Status: SHIPPED | OUTPATIENT
Start: 2020-06-30 | End: 2020-07-07 | Stop reason: SDUPTHER

## 2020-06-30 NOTE — TELEPHONE ENCOUNTER
Patient states the prescription for triazolam costs her 49.11, she says temazepam only cost 11.  She said she just got a message from the pharmacy that the triazolam is ready.  She says she can't afford that and wants to know if she could get the triasolam called in.  Pharmacy is [facility] [address].  Her phone number is [phone].

## 2020-06-30 NOTE — TELEPHONE ENCOUNTER
Tell her I sent it over to [facility].  I also inadvertantly sent it to [facility] pharmacy, so can you cancel it there.  Let her know she needs to stay with only one pharmacy or she risks not being able to get it refilled.

## 2020-07-07 ENCOUNTER — OFFICE VISIT (OUTPATIENT)
Dept: FAMILY MEDICINE CLINIC | Facility: CLINIC | Age: 65
End: 2020-07-07

## 2020-07-07 ENCOUNTER — RESULTS ENCOUNTER (OUTPATIENT)
Dept: FAMILY MEDICINE CLINIC | Facility: CLINIC | Age: 65
End: 2020-07-07

## 2020-07-07 VITALS
DIASTOLIC BLOOD PRESSURE: 82 MMHG | OXYGEN SATURATION: 98 % | BODY MASS INDEX: 21.24 KG/M2 | SYSTOLIC BLOOD PRESSURE: 126 MMHG | HEIGHT: 65 IN | TEMPERATURE: 97.1 F | WEIGHT: 127.5 LBS | HEART RATE: 96 BPM

## 2020-07-07 DIAGNOSIS — R60.9 EDEMA, UNSPECIFIED TYPE: ICD-10-CM

## 2020-07-07 DIAGNOSIS — E55.9 VITAMIN D DEFICIENCY: ICD-10-CM

## 2020-07-07 DIAGNOSIS — R53.83 OTHER FATIGUE: ICD-10-CM

## 2020-07-07 DIAGNOSIS — F32.A DEPRESSION, UNSPECIFIED DEPRESSION TYPE: ICD-10-CM

## 2020-07-07 DIAGNOSIS — K21.9 GASTROESOPHAGEAL REFLUX DISEASE WITHOUT ESOPHAGITIS: ICD-10-CM

## 2020-07-07 DIAGNOSIS — R21 RASH: ICD-10-CM

## 2020-07-07 DIAGNOSIS — G47.00 INSOMNIA, UNSPECIFIED TYPE: ICD-10-CM

## 2020-07-07 DIAGNOSIS — E78.5 HYPERLIPIDEMIA, UNSPECIFIED HYPERLIPIDEMIA TYPE: ICD-10-CM

## 2020-07-07 DIAGNOSIS — F17.200 SMOKER: ICD-10-CM

## 2020-07-07 DIAGNOSIS — R63.4 WEIGHT LOSS: Primary | ICD-10-CM

## 2020-07-07 DIAGNOSIS — R63.4 WEIGHT LOSS: ICD-10-CM

## 2020-07-07 DIAGNOSIS — I10 ESSENTIAL HYPERTENSION: ICD-10-CM

## 2020-07-07 PROCEDURE — 99214 OFFICE O/P EST MOD 30 MIN: CPT | Performed by: FAMILY MEDICINE

## 2020-07-07 RX ORDER — LOSARTAN POTASSIUM 100 MG/1
100 TABLET ORAL DAILY
Qty: 30 TABLET | Refills: 11 | Status: SHIPPED | OUTPATIENT
Start: 2020-07-07 | End: 2021-07-19

## 2020-07-07 RX ORDER — ESCITALOPRAM OXALATE 20 MG/1
20 TABLET ORAL DAILY
Qty: 30 TABLET | Refills: 11 | Status: SHIPPED | OUTPATIENT
Start: 2020-07-07 | End: 2021-08-03

## 2020-07-07 RX ORDER — TEMAZEPAM 30 MG/1
30 CAPSULE ORAL NIGHTLY PRN
Qty: 30 CAPSULE | Refills: 3 | Status: SHIPPED | OUTPATIENT
Start: 2020-07-07 | End: 2020-12-21

## 2020-07-07 RX ORDER — OMEPRAZOLE 40 MG/1
40 CAPSULE, DELAYED RELEASE ORAL DAILY
Qty: 30 CAPSULE | Refills: 11 | Status: SHIPPED | OUTPATIENT
Start: 2020-07-07 | End: 2021-07-19

## 2020-07-07 RX ORDER — AMMONIUM LACTATE 120 MG/G
CREAM TOPICAL AS NEEDED
Qty: 500 G | Refills: 11 | Status: SHIPPED | OUTPATIENT
Start: 2020-07-07 | End: 2022-05-31 | Stop reason: SDUPTHER

## 2020-07-07 RX ORDER — AMMONIUM LACTATE 120 MG/G
CREAM TOPICAL AS NEEDED
COMMUNITY
End: 2020-07-07 | Stop reason: SDUPTHER

## 2020-07-07 RX ORDER — ATORVASTATIN CALCIUM 20 MG/1
20 TABLET, FILM COATED ORAL DAILY
Qty: 90 TABLET | Refills: 3 | Status: SHIPPED | OUTPATIENT
Start: 2020-07-07 | End: 2021-07-19

## 2020-07-07 RX ORDER — BUMETANIDE 2 MG/1
2 TABLET ORAL DAILY
Qty: 30 TABLET | Refills: 11 | Status: SHIPPED | OUTPATIENT
Start: 2020-07-07 | End: 2021-07-30

## 2020-07-07 RX ORDER — POTASSIUM CHLORIDE 20 MEQ/1
20 TABLET, EXTENDED RELEASE ORAL DAILY
Qty: 30 TABLET | Refills: 11 | Status: SHIPPED | OUTPATIENT
Start: 2020-07-07 | End: 2021-07-12

## 2020-07-07 NOTE — PROGRESS NOTES
Subjective   Karen Wood is a 64 y.o. female.     Chief Complaint   Patient presents with   • weightloss       Weight Loss   This is a new problem. The current episode started more than 1 month ago. The problem occurs constantly. The problem has been gradually worsening. Pertinent negatives include no abdominal pain, anorexia, arthralgias, change in bowel habit, chest pain, chills, congestion, coughing, diaphoresis, fatigue, fever, headaches, joint swelling, myalgias, nausea, neck pain or numbness. Nothing aggravates the symptoms. She has tried nothing for the symptoms.   Hypertension   This is a chronic problem. The current episode started more than 1 year ago. The problem is unchanged. The problem is controlled. Pertinent negatives include no chest pain, headaches or neck pain.   Hyperlipidemia   This is a chronic problem. The current episode started more than 1 year ago. The problem is controlled. Recent lipid tests were reviewed and are normal. Pertinent negatives include no chest pain or myalgias.          The following portions of the patient's history were reviewed and updated as appropriate: allergies, current medications, past family history, past medical history, past social history, past surgical history and problem list.    Past Medical History:   Diagnosis Date   • Blunt head trauma    • Blunt trauma of rib    • Breast cyst    • Chest pain    • Current every day smoker    • Depression    • Encounter for preventive health examination    • Esophageal reflux    • Fall    • Fatigue    • Heart disease    • High risk medication use    • Hyperlipidemia    • Hypertension    • Hypokalemia    • Insomnia    • Lump or mass in breast    • Refused influenza vaccine    • Rib contusion    • Rib pain on left side    • Seasonal allergies    • Thyroid disorder    • Visit for routine gyn exam        Past Surgical History:   Procedure Laterality Date   • HYSTERECTOMY         Family History   Problem Relation Age of  Onset   • Depression Mother    • Hyperlipidemia Mother    • Hypertension Mother    • Heart disease Mother    • Kidney disease Mother    • Anxiety disorder Mother    • Other Mother         Convulsions   • Rheum arthritis Mother    • Osteoarthritis Mother    • Migraines Mother    • Diabetes Mother    • Stroke Mother    • Thyroid disease Mother    • Kidney disease Father    • Anxiety disorder Brother    • Bipolar disorder Brother    • Rheum arthritis Brother    • Depression Brother    • Asthma Brother    • Other Brother         Overweight   • Osteoarthritis Paternal Grandmother    • Diabetes Paternal Grandmother    • Anxiety disorder Paternal Grandmother    • Anxiety disorder Paternal Grandfather    • Stroke Paternal Grandfather    • Other Paternal Grandfather         Convulsions   • Heart disease Paternal Grandfather    • Hyperlipidemia Paternal Grandfather    • Hypertension Paternal Grandfather    • Kidney disease Paternal Grandfather    • Thyroid disease Paternal Grandfather    • Osteoarthritis Paternal Grandfather    • Asthma Paternal Grandfather    • No Known Problems Other        Social History     Socioeconomic History   • Marital status:      Spouse name: Not on file   • Number of children: Not on file   • Years of education: Not on file   • Highest education level: Not on file   Tobacco Use   • Smoking status: Current Every Day Smoker   • Smokeless tobacco: Never Used   Substance and Sexual Activity   • Alcohol use: Yes   • Drug use: Never   • Sexual activity: Yes       Current Outpatient Medications on File Prior to Visit   Medication Sig Dispense Refill   • cetirizine (zyrTEC) 10 MG tablet Take 1 tablet by mouth Daily. 30 tablet 11   • Omega-3 Fatty Acids (FISH OIL) 1200 MG capsule capsule Take 2 capsules by mouth 2 (Two) Times a Day With Meals. 120 capsule 11   • varenicline (CHANTIX CONTINUING MONTH BRANDON) 1 MG tablet Take 1 tablet by mouth 2 (Two) Times a Day. 60 tablet 4   • varenicline (Chantix  Starting Month Robles) 0.5 MG X 11 & 1 MG X 42 tablet Take 0.5 mg one daily on days 1-3 and and 0.5 mg twice daily on days 4-7.Then 1 mg twice daily for a total of 12 weeks. 60 tablet 0   • [DISCONTINUED] ammonium lactate (AMLACTIN) 12 % cream Apply  topically to the appropriate area as directed As Needed for Dry Skin.     • [DISCONTINUED] atorvastatin (LIPITOR) 20 MG tablet TAKE 1 TABLET BY MOUTH ONE TIME A DAY  90 tablet 0   • [DISCONTINUED] bumetanide (BUMEX) 2 MG tablet TAKE ONE TABLET BY MOUTH DAILY 15 tablet 0   • [DISCONTINUED] bumetanide (BUMEX) 2 MG tablet Take 1 tablet by mouth Daily. 15 tablet 0   • [DISCONTINUED] escitalopram (Lexapro) 20 MG tablet Take 1 tablet by mouth Daily. 30 tablet 3   • [DISCONTINUED] losartan (COZAAR) 100 MG tablet TAKE ONE TABLET BY MOUTH DAILY 30 tablet 0   • [DISCONTINUED] omeprazole (priLOSEC) 40 MG capsule TAKE ONE CAPSULE BY MOUTH DAILY 30 capsule 2   • [DISCONTINUED] omeprazole (priLOSEC) 40 MG capsule Take 1 capsule by mouth Daily. 30 capsule 3   • [DISCONTINUED] potassium chloride (K-DUR,KLOR-CON) 20 MEQ CR tablet Take 1 tablet by mouth Daily. 30 tablet 1   • [DISCONTINUED] temazepam (RESTORIL) 30 MG capsule Take 1 capsule by mouth At Night As Needed for Sleep. 30 capsule 0     No current facility-administered medications on file prior to visit.        Review of Systems   Constitutional: Positive for unexpected weight loss. Negative for chills, diaphoresis, fatigue and fever.   HENT: Negative for congestion.    Respiratory: Negative for cough.    Cardiovascular: Negative for chest pain.   Gastrointestinal: Negative for abdominal pain, anorexia, change in bowel habit and nausea.   Musculoskeletal: Negative for arthralgias, joint swelling, myalgias and neck pain.   Neurological: Negative for numbness.       Recent Results (from the past 4704 hour(s))   Comprehensive Metabolic Panel    Collection Time: 01/14/20  4:58 PM   Result Value Ref Range    Glucose 97 65 - 99 mg/dL     BUN 17 8 - 23 mg/dL    Creatinine 0.89 0.57 - 1.00 mg/dL    eGFR Non African Am 64 >60 mL/min/1.73    eGFR African Am 78 >60 mL/min/1.73    BUN/Creatinine Ratio 19.1 7.0 - 25.0    Sodium 139 136 - 145 mmol/L    Potassium 4.8 3.5 - 5.2 mmol/L    Chloride 99 98 - 107 mmol/L    Total CO2 27.2 22.0 - 29.0 mmol/L    Calcium 10.0 8.6 - 10.5 mg/dL    Total Protein 7.1 6.0 - 8.5 g/dL    Albumin 4.60 3.50 - 5.20 g/dL    Globulin 2.5 gm/dL    A/G Ratio 1.8 g/dL    Total Bilirubin 0.2 0.2 - 1.2 mg/dL    Alkaline Phosphatase 58 39 - 117 U/L    AST (SGOT) 22 1 - 32 U/L    ALT (SGPT) 21 1 - 33 U/L   Lipid Panel    Collection Time: 01/14/20  4:58 PM   Result Value Ref Range    Total Cholesterol 226 (H) 0 - 200 mg/dL    Triglycerides 84 0 - 150 mg/dL    HDL Cholesterol 86 (H) 40 - 60 mg/dL    VLDL Cholesterol 16.8 mg/dL    LDL Cholesterol  123 (H) 0 - 100 mg/dL   CBC & Differential    Collection Time: 01/14/20  4:58 PM   Result Value Ref Range    WBC 6.61 3.40 - 10.80 10*3/mm3    RBC 3.97 3.77 - 5.28 10*6/mm3    Hemoglobin 12.0 12.0 - 15.9 g/dL    Hematocrit 37.3 34.0 - 46.6 %    MCV 94.0 79.0 - 97.0 fL    MCH 30.2 26.6 - 33.0 pg    MCHC 32.2 31.5 - 35.7 g/dL    RDW 12.2 (L) 12.3 - 15.4 %    Platelets 321 140 - 450 10*3/mm3    Neutrophil Rel % 59.0 42.7 - 76.0 %    Lymphocyte Rel % 27.5 19.6 - 45.3 %    Monocyte Rel % 11.2 5.0 - 12.0 %    Eosinophil Rel % 1.1 0.3 - 6.2 %    Basophil Rel % 0.9 0.0 - 1.5 %    Neutrophils Absolute 3.90 1.70 - 7.00 10*3/mm3    Lymphocytes Absolute 1.82 0.70 - 3.10 10*3/mm3    Monocytes Absolute 0.74 0.10 - 0.90 10*3/mm3    Eosinophils Absolute 0.07 0.00 - 0.40 10*3/mm3    Basophils Absolute 0.06 0.00 - 0.20 10*3/mm3    Immature Granulocyte Rel % 0.3 0.0 - 0.5 %    Immature Grans Absolute 0.02 0.00 - 0.05 10*3/mm3    nRBC 0.2 0.0 - 0.2 /100 WBC   Hepatitis C Antibody    Collection Time: 01/14/20  4:58 PM   Result Value Ref Range    Hep C Virus Ab 0.3 0.0 - 0.9 s/co ratio   TSH Rfx On Abnormal To Free T4  "   Collection Time: 04/06/20  4:02 PM   Result Value Ref Range    TSH 0.364 0.270 - 4.200 uIU/mL   T4F    Collection Time: 04/06/20  4:02 PM   Result Value Ref Range    Free T4 0.92 (L) 0.93 - 1.70 ng/dL     Objective   Vitals:    07/07/20 1000   BP: 126/82   Pulse: 96   Temp: 97.1 °F (36.2 °C)   SpO2: 98%   Weight: 57.8 kg (127 lb 8 oz)   Height: 165.1 cm (65\")     Body mass index is 21.22 kg/m².  Physical Exam   Constitutional: She appears well-developed and well-nourished. No distress.   Cardiovascular: Normal rate and regular rhythm.   Pulmonary/Chest: Effort normal and breath sounds normal. No respiratory distress. She has no wheezes.   Skin: She is not diaphoretic.   Nursing note and vitals reviewed.        Assessment/Plan   Karen was seen today for weightloss.    Diagnoses and all orders for this visit:    Weight loss  -     TSH Rfx On Abnormal To Free T4  -     Comprehensive Metabolic Panel  -     Lipid Panel  -     CBC & Differential  -     Cancel: Occult Blood, Fecal By Immunoassay - Stool, Per Rectum  -     Occult Blood, Fecal By Immunoassay - Stool, Per Rectum; Future    Hyperlipidemia, unspecified hyperlipidemia type  -     atorvastatin (LIPITOR) 20 MG tablet; Take 1 tablet by mouth Daily. 200001    Edema, unspecified type  -     bumetanide (BUMEX) 2 MG tablet; Take 1 tablet by mouth Daily.    Essential hypertension  -     potassium chloride (K-DUR,KLOR-CON) 20 MEQ CR tablet; Take 1 tablet by mouth Daily.  -     losartan (COZAAR) 100 MG tablet; Take 1 tablet by mouth Daily.    Gastroesophageal reflux disease without esophagitis  -     omeprazole (priLOSEC) 40 MG capsule; Take 1 capsule by mouth Daily.    Depression, unspecified depression type  -     escitalopram (Lexapro) 20 MG tablet; Take 1 tablet by mouth Daily.    Insomnia, unspecified type  -     temazepam (RESTORIL) 30 MG capsule; Take 1 capsule by mouth At Night As Needed for Sleep.    Other fatigue  -     Vitamin B12 & Folate  -     Cancel: " Occult Blood, Fecal By Immunoassay - Stool, Per Rectum  -     Occult Blood, Fecal By Immunoassay - Stool, Per Rectum; Future    Vitamin D deficiency  -     Vitamin D 25 Hydroxy    Rash  -     ammonium lactate (AMLACTIN) 12 % cream; Apply  topically to the appropriate area as directed As Needed for Dry Skin.    Smoker  -     CT Chest Low Dose Wo; Future

## 2020-07-08 LAB
25(OH)D3+25(OH)D2 SERPL-MCNC: 98.6 NG/ML (ref 30–100)
ALBUMIN SERPL-MCNC: 4.8 G/DL (ref 3.5–5.2)
ALBUMIN/GLOB SERPL: 2.1 G/DL
ALP SERPL-CCNC: 70 U/L (ref 39–117)
ALT SERPL-CCNC: 21 U/L (ref 1–33)
AST SERPL-CCNC: 26 U/L (ref 1–32)
BASOPHILS # BLD AUTO: 0.04 10*3/MM3 (ref 0–0.2)
BASOPHILS NFR BLD AUTO: 0.6 % (ref 0–1.5)
BILIRUB SERPL-MCNC: 0.3 MG/DL (ref 0–1.2)
BUN SERPL-MCNC: 14 MG/DL (ref 8–23)
BUN/CREAT SERPL: 20 (ref 7–25)
CALCIUM SERPL-MCNC: 9.6 MG/DL (ref 8.6–10.5)
CHLORIDE SERPL-SCNC: 97 MMOL/L (ref 98–107)
CHOLEST SERPL-MCNC: 246 MG/DL (ref 0–200)
CO2 SERPL-SCNC: 28.7 MMOL/L (ref 22–29)
CREAT SERPL-MCNC: 0.7 MG/DL (ref 0.57–1)
EOSINOPHIL # BLD AUTO: 0.02 10*3/MM3 (ref 0–0.4)
EOSINOPHIL NFR BLD AUTO: 0.3 % (ref 0.3–6.2)
ERYTHROCYTE [DISTWIDTH] IN BLOOD BY AUTOMATED COUNT: 12.2 % (ref 12.3–15.4)
FOLATE SERPL-MCNC: >20 NG/ML (ref 4.78–24.2)
GLOBULIN SER CALC-MCNC: 2.3 GM/DL
GLUCOSE SERPL-MCNC: 96 MG/DL (ref 65–99)
HCT VFR BLD AUTO: 36.1 % (ref 34–46.6)
HDLC SERPL-MCNC: 85 MG/DL (ref 40–60)
HGB BLD-MCNC: 11.9 G/DL (ref 12–15.9)
IMM GRANULOCYTES # BLD AUTO: 0.03 10*3/MM3 (ref 0–0.05)
IMM GRANULOCYTES NFR BLD AUTO: 0.4 % (ref 0–0.5)
LDLC SERPL CALC-MCNC: 136 MG/DL (ref 0–100)
LYMPHOCYTES # BLD AUTO: 1.28 10*3/MM3 (ref 0.7–3.1)
LYMPHOCYTES NFR BLD AUTO: 19 % (ref 19.6–45.3)
MCH RBC QN AUTO: 30.6 PG (ref 26.6–33)
MCHC RBC AUTO-ENTMCNC: 33 G/DL (ref 31.5–35.7)
MCV RBC AUTO: 92.8 FL (ref 79–97)
MONOCYTES # BLD AUTO: 0.69 10*3/MM3 (ref 0.1–0.9)
MONOCYTES NFR BLD AUTO: 10.2 % (ref 5–12)
NEUTROPHILS # BLD AUTO: 4.69 10*3/MM3 (ref 1.7–7)
NEUTROPHILS NFR BLD AUTO: 69.5 % (ref 42.7–76)
NRBC BLD AUTO-RTO: 0 /100 WBC (ref 0–0.2)
PLATELET # BLD AUTO: 293 10*3/MM3 (ref 140–450)
POTASSIUM SERPL-SCNC: 3.8 MMOL/L (ref 3.5–5.2)
PROT SERPL-MCNC: 7.1 G/DL (ref 6–8.5)
RBC # BLD AUTO: 3.89 10*6/MM3 (ref 3.77–5.28)
SODIUM SERPL-SCNC: 137 MMOL/L (ref 136–145)
T4 FREE SERPL-MCNC: 1.1 NG/DL (ref 0.93–1.7)
TRIGL SERPL-MCNC: 127 MG/DL (ref 0–150)
TSH SERPL DL<=0.005 MIU/L-ACNC: 0.33 UIU/ML (ref 0.27–4.2)
VIT B12 SERPL-MCNC: 497 PG/ML (ref 211–946)
VLDLC SERPL CALC-MCNC: 25.4 MG/DL
WBC # BLD AUTO: 6.75 10*3/MM3 (ref 3.4–10.8)

## 2020-08-12 ENCOUNTER — TELEPHONE (OUTPATIENT)
Dept: FAMILY MEDICINE CLINIC | Facility: CLINIC | Age: 65
End: 2020-08-12

## 2020-08-12 NOTE — TELEPHONE ENCOUNTER
Patient states Selwyn on  [facility]anisha states that the starter for Chantex was sent over and not the continuation.  She took the last of the starter yesterday and says she needs the continuation please.  Her phone number is [phone].

## 2020-08-13 DIAGNOSIS — F17.200 SMOKING: ICD-10-CM

## 2020-08-13 RX ORDER — VARENICLINE TARTRATE 1 MG/1
1 TABLET, FILM COATED ORAL 2 TIMES DAILY
Qty: 60 TABLET | Refills: 4 | Status: SHIPPED | OUTPATIENT
Start: 2020-08-13 | End: 2021-01-29

## 2020-12-21 DIAGNOSIS — G47.00 INSOMNIA, UNSPECIFIED TYPE: ICD-10-CM

## 2020-12-21 RX ORDER — TEMAZEPAM 30 MG/1
CAPSULE ORAL
Qty: 30 CAPSULE | Refills: 2 | Status: SHIPPED | OUTPATIENT
Start: 2020-12-21 | End: 2021-04-22

## 2021-01-25 DIAGNOSIS — J30.1 ALLERGIC RHINITIS DUE TO POLLEN, UNSPECIFIED SEASONALITY: ICD-10-CM

## 2021-01-26 RX ORDER — CETIRIZINE HYDROCHLORIDE 10 MG/1
TABLET ORAL
Qty: 30 TABLET | Refills: 0 | Status: SHIPPED | OUTPATIENT
Start: 2021-01-26 | End: 2021-05-03

## 2021-01-29 DIAGNOSIS — F17.200 SMOKING: ICD-10-CM

## 2021-01-29 RX ORDER — VARENICLINE TARTRATE 1 MG/1
TABLET, FILM COATED ORAL
Qty: 56 TABLET | Refills: 3 | Status: SHIPPED | OUTPATIENT
Start: 2021-01-29 | End: 2021-11-16

## 2021-02-01 RX ORDER — VARENICLINE TARTRATE 1 MG/1
TABLET, FILM COATED ORAL
Qty: 56 TABLET | Refills: 3 | OUTPATIENT
Start: 2021-02-01

## 2021-03-16 ENCOUNTER — BULK ORDERING (OUTPATIENT)
Dept: CASE MANAGEMENT | Facility: OTHER | Age: 66
End: 2021-03-16

## 2021-03-16 ENCOUNTER — OFFICE VISIT (OUTPATIENT)
Dept: FAMILY MEDICINE CLINIC | Facility: CLINIC | Age: 66
End: 2021-03-16

## 2021-03-16 VITALS
HEIGHT: 65 IN | HEART RATE: 70 BPM | BODY MASS INDEX: 21.83 KG/M2 | WEIGHT: 131 LBS | TEMPERATURE: 97.4 F | DIASTOLIC BLOOD PRESSURE: 68 MMHG | OXYGEN SATURATION: 96 % | SYSTOLIC BLOOD PRESSURE: 122 MMHG

## 2021-03-16 DIAGNOSIS — Z23 IMMUNIZATION DUE: ICD-10-CM

## 2021-03-16 DIAGNOSIS — Z12.31 VISIT FOR SCREENING MAMMOGRAM: ICD-10-CM

## 2021-03-16 DIAGNOSIS — Z00.00 HEALTH MAINTENANCE EXAMINATION: Primary | ICD-10-CM

## 2021-03-16 PROBLEM — Z28.21 INFLUENZA VACCINATION DECLINED: Status: ACTIVE | Noted: 2017-06-20

## 2021-03-16 PROBLEM — M54.16 LUMBAR RADICULOPATHY: Status: ACTIVE | Noted: 2021-03-16

## 2021-03-16 PROBLEM — M25.569 GONALGIA: Status: ACTIVE | Noted: 2021-03-16

## 2021-03-16 PROBLEM — H11.9 LESION OF CONJUNCTIVA: Status: ACTIVE | Noted: 2017-07-10

## 2021-03-16 PROBLEM — M51.36 BULGE OF LUMBAR DISC WITHOUT MYELOPATHY: Status: ACTIVE | Noted: 2021-03-16

## 2021-03-16 PROBLEM — E05.00 FLAJANI DISEASE: Status: ACTIVE | Noted: 2021-03-16

## 2021-03-16 PROBLEM — M47.812 CERVICAL OSTEOARTHRITIS: Status: ACTIVE | Noted: 2021-03-16

## 2021-03-16 PROBLEM — G47.00 INSOMNIA: Status: ACTIVE | Noted: 2019-01-18

## 2021-03-16 PROBLEM — M51.369 BULGE OF LUMBAR DISC WITHOUT MYELOPATHY: Status: ACTIVE | Noted: 2021-03-16

## 2021-03-16 PROBLEM — J32.9 SINUSITIS: Status: ACTIVE | Noted: 2019-04-15

## 2021-03-16 PROCEDURE — 99396 PREV VISIT EST AGE 40-64: CPT | Performed by: FAMILY MEDICINE

## 2021-03-16 NOTE — PROGRESS NOTES
"Karen Wood is here today for an annual physical exam.     Eating a healthy diet. Exercising routinely. YES  Regular periods. Wears seat belt. Feels safe at home.   Sexual activity:NO  Birth control:N/A  Pregnancy:2  Sexual and gender orientation:N/A    PHQ-2 Depression Screening  Little interest or pleasure in doing things?  0   Feeling down, depressed, or hopeless?  0   PHQ-2 Total Score  0         I have reviewed the patient's medical, family, and social history in detail and updated the computerized patient record.    Screening history:  Colonoscopy -2014   Mammogram- DUE, Pap/pelvic - S/P HYSTERECTOMY  Metabolic - THIN  DENTAL UP TO DATE  VISION UP TO DATE    Health Maintenance   Topic Date Due   • COVID-19 Vaccine (1 of 2) Never done   • TDAP/TD VACCINES (1 - Tdap) Never done   • LIPID PANEL  07/07/2021   • MAMMOGRAM  02/26/2022   • ANNUAL PHYSICAL  03/17/2022   • COLONOSCOPY  07/18/2024   • HEPATITIS C SCREENING  Completed   • INFLUENZA VACCINE  Completed   • Pneumococcal Vaccine 0-64  Aged Out   • MENINGOCOCCAL VACCINE  Aged Out   • PAP SMEAR  Discontinued   • ZOSTER VACCINE  Discontinued       Review of Systems   Constitutional: Negative.        /68   Pulse 70   Temp 97.4 °F (36.3 °C)   Ht 165 cm (64.96\")   Wt 59.4 kg (131 lb)   SpO2 96%   BMI 21.83 kg/m²      Physical Exam      Physical Exam  No visits with results within 2 Week(s) from this visit.   Latest known visit with results is:   Office Visit on 07/07/2020   Component Date Value Ref Range Status   • TSH 07/07/2020 0.325  0.270 - 4.200 uIU/mL Final   • Glucose 07/07/2020 96  65 - 99 mg/dL Final   • BUN 07/07/2020 14  8 - 23 mg/dL Final   • Creatinine 07/07/2020 0.70  0.57 - 1.00 mg/dL Final   • eGFR Non African Am 07/07/2020 85  >60 mL/min/1.73 Final   • eGFR African Am 07/07/2020 103  >60 mL/min/1.73 Final   • BUN/Creatinine Ratio 07/07/2020 20.0  7.0 - 25.0 Final   • Sodium 07/07/2020 137  136 - 145 mmol/L Final   • Potassium " 07/07/2020 3.8  3.5 - 5.2 mmol/L Final   • Chloride 07/07/2020 97* 98 - 107 mmol/L Final   • Total CO2 07/07/2020 28.7  22.0 - 29.0 mmol/L Final   • Calcium 07/07/2020 9.6  8.6 - 10.5 mg/dL Final   • Total Protein 07/07/2020 7.1  6.0 - 8.5 g/dL Final   • Albumin 07/07/2020 4.80  3.50 - 5.20 g/dL Final   • Globulin 07/07/2020 2.3  gm/dL Final   • A/G Ratio 07/07/2020 2.1  g/dL Final   • Total Bilirubin 07/07/2020 0.3  0.0 - 1.2 mg/dL Final   • Alkaline Phosphatase 07/07/2020 70  39 - 117 U/L Final   • AST (SGOT) 07/07/2020 26  1 - 32 U/L Final   • ALT (SGPT) 07/07/2020 21  1 - 33 U/L Final   • Total Cholesterol 07/07/2020 246* 0 - 200 mg/dL Final   • Triglycerides 07/07/2020 127  0 - 150 mg/dL Final   • HDL Cholesterol 07/07/2020 85* 40 - 60 mg/dL Final   • VLDL Cholesterol 07/07/2020 25.4  mg/dL Final   • LDL Cholesterol  07/07/2020 136* 0 - 100 mg/dL Final   • WBC 07/07/2020 6.75  3.40 - 10.80 10*3/mm3 Final   • RBC 07/07/2020 3.89  3.77 - 5.28 10*6/mm3 Final   • Hemoglobin 07/07/2020 11.9* 12.0 - 15.9 g/dL Final   • Hematocrit 07/07/2020 36.1  34.0 - 46.6 % Final   • MCV 07/07/2020 92.8  79.0 - 97.0 fL Final   • MCH 07/07/2020 30.6  26.6 - 33.0 pg Final   • MCHC 07/07/2020 33.0  31.5 - 35.7 g/dL Final   • RDW 07/07/2020 12.2* 12.3 - 15.4 % Final   • Platelets 07/07/2020 293  140 - 450 10*3/mm3 Final   • Neutrophil Rel % 07/07/2020 69.5  42.7 - 76.0 % Final   • Lymphocyte Rel % 07/07/2020 19.0* 19.6 - 45.3 % Final   • Monocyte Rel % 07/07/2020 10.2  5.0 - 12.0 % Final   • Eosinophil Rel % 07/07/2020 0.3  0.3 - 6.2 % Final   • Basophil Rel % 07/07/2020 0.6  0.0 - 1.5 % Final   • Neutrophils Absolute 07/07/2020 4.69  1.70 - 7.00 10*3/mm3 Final   • Lymphocytes Absolute 07/07/2020 1.28  0.70 - 3.10 10*3/mm3 Final   • Monocytes Absolute 07/07/2020 0.69  0.10 - 0.90 10*3/mm3 Final   • Eosinophils Absolute 07/07/2020 0.02  0.00 - 0.40 10*3/mm3 Final   • Basophils Absolute 07/07/2020 0.04  0.00 - 0.20 10*3/mm3 Final   •  Immature Granulocyte Rel % 07/07/2020 0.4  0.0 - 0.5 % Final   • Immature Grans Absolute 07/07/2020 0.03  0.00 - 0.05 10*3/mm3 Final   • nRBC 07/07/2020 0.0  0.0 - 0.2 /100 WBC Final   • Vitamin B-12 07/07/2020 497  211 - 946 pg/mL Final    Results may be falsely increased if patient taking Biotin.   • Folate 07/07/2020 >20.00  4.78 - 24.20 ng/mL Final    Results may be falsely increased if patient taking Biotin.   • 25 Hydroxy, Vitamin D 07/07/2020 98.6  30.0 - 100.0 ng/ml Final    Comment: Results may be falsely increased if patient taking Biotin.  Reference Range for Total Vitamin D 25(OH)  Deficiency <20.0 ng/mL  Insufficiency 21-29 ng/mL  Sufficiency  ng/mL  Toxicity >100 ng/ml     • Free T4 07/07/2020 1.10  0.93 - 1.70 ng/dL Final    Results may be falsely increased if patient taking Biotin.         Current Outpatient Medications:   •  ammonium lactate (AMLACTIN) 12 % cream, Apply  topically to the appropriate area as directed As Needed for Dry Skin., Disp: 500 g, Rfl: 11  •  atorvastatin (LIPITOR) 20 MG tablet, Take 1 tablet by mouth Daily. 200001, Disp: 90 tablet, Rfl: 3  •  bumetanide (BUMEX) 2 MG tablet, Take 1 tablet by mouth Daily., Disp: 30 tablet, Rfl: 11  •  cetirizine (zyrTEC) 10 MG tablet, TAKE 1 TABLET BY MOUTH ONE TIME A DAY , Disp: 30 tablet, Rfl: 0  •  Chantix Continuing Month Robles 1 MG tablet, TAKE ONE TABLET BY MOUTH TWICE A DAY, Disp: 56 tablet, Rfl: 3  •  escitalopram (Lexapro) 20 MG tablet, Take 1 tablet by mouth Daily., Disp: 30 tablet, Rfl: 11  •  losartan (COZAAR) 100 MG tablet, Take 1 tablet by mouth Daily., Disp: 30 tablet, Rfl: 11  •  Omega-3 Fatty Acids (FISH OIL) 1200 MG capsule capsule, Take 2 capsules by mouth 2 (Two) Times a Day With Meals., Disp: 120 capsule, Rfl: 11  •  omeprazole (priLOSEC) 40 MG capsule, Take 1 capsule by mouth Daily., Disp: 30 capsule, Rfl: 11  •  potassium chloride (K-DUR,KLOR-CON) 20 MEQ CR tablet, Take 1 tablet by mouth Daily., Disp: 30 tablet, Rfl:  11  •  temazepam (RESTORIL) 30 MG capsule, TAKE ONE CAPSULE BY MOUTH ONCE NIGHTLY AS NEEDED FOR SLEEP, Disp: 30 capsule, Rfl: 2    Diagnoses and all orders for this visit:    1. Health maintenance examination (Primary)    2. Visit for screening mammogram      PREVENTIVE GUIDANCE GIVEN  Encourage healthy diet and exercise.  Encourage patient to stay up to date on screening examinations as indicated based on age and risk factors. F/U yearly.

## 2021-03-17 LAB
ALBUMIN SERPL-MCNC: 4.4 G/DL (ref 3.5–5.2)
ALBUMIN/GLOB SERPL: 1.8 G/DL
ALP SERPL-CCNC: 47 U/L (ref 39–117)
ALT SERPL-CCNC: 40 U/L (ref 1–33)
AST SERPL-CCNC: 40 U/L (ref 1–32)
BASOPHILS # BLD AUTO: 0.04 10*3/MM3 (ref 0–0.2)
BASOPHILS NFR BLD AUTO: 0.9 % (ref 0–1.5)
BILIRUB SERPL-MCNC: 0.6 MG/DL (ref 0–1.2)
BUN SERPL-MCNC: 16 MG/DL (ref 8–23)
BUN/CREAT SERPL: 22.2 (ref 7–25)
CALCIUM SERPL-MCNC: 10 MG/DL (ref 8.6–10.5)
CHLORIDE SERPL-SCNC: 104 MMOL/L (ref 98–107)
CHOLEST SERPL-MCNC: 239 MG/DL (ref 0–200)
CO2 SERPL-SCNC: 29.2 MMOL/L (ref 22–29)
CREAT SERPL-MCNC: 0.72 MG/DL (ref 0.57–1)
EOSINOPHIL # BLD AUTO: 0.06 10*3/MM3 (ref 0–0.4)
EOSINOPHIL NFR BLD AUTO: 1.4 % (ref 0.3–6.2)
ERYTHROCYTE [DISTWIDTH] IN BLOOD BY AUTOMATED COUNT: 11.7 % (ref 12.3–15.4)
GLOBULIN SER CALC-MCNC: 2.4 GM/DL
GLUCOSE SERPL-MCNC: 94 MG/DL (ref 65–99)
HCT VFR BLD AUTO: 37.5 % (ref 34–46.6)
HDLC SERPL-MCNC: 105 MG/DL (ref 40–60)
HGB BLD-MCNC: 12 G/DL (ref 12–15.9)
IMM GRANULOCYTES # BLD AUTO: 0.01 10*3/MM3 (ref 0–0.05)
IMM GRANULOCYTES NFR BLD AUTO: 0.2 % (ref 0–0.5)
LDLC SERPL CALC-MCNC: 118 MG/DL (ref 0–100)
LYMPHOCYTES # BLD AUTO: 0.85 10*3/MM3 (ref 0.7–3.1)
LYMPHOCYTES NFR BLD AUTO: 19.6 % (ref 19.6–45.3)
MCH RBC QN AUTO: 29.3 PG (ref 26.6–33)
MCHC RBC AUTO-ENTMCNC: 32 G/DL (ref 31.5–35.7)
MCV RBC AUTO: 91.7 FL (ref 79–97)
MONOCYTES # BLD AUTO: 0.56 10*3/MM3 (ref 0.1–0.9)
MONOCYTES NFR BLD AUTO: 12.9 % (ref 5–12)
NEUTROPHILS # BLD AUTO: 2.81 10*3/MM3 (ref 1.7–7)
NEUTROPHILS NFR BLD AUTO: 65 % (ref 42.7–76)
NRBC BLD AUTO-RTO: 0 /100 WBC (ref 0–0.2)
PLATELET # BLD AUTO: 348 10*3/MM3 (ref 140–450)
POTASSIUM SERPL-SCNC: 4.4 MMOL/L (ref 3.5–5.2)
PROT SERPL-MCNC: 6.8 G/DL (ref 6–8.5)
RBC # BLD AUTO: 4.09 10*6/MM3 (ref 3.77–5.28)
SODIUM SERPL-SCNC: 143 MMOL/L (ref 136–145)
TRIGL SERPL-MCNC: 96 MG/DL (ref 0–150)
VLDLC SERPL CALC-MCNC: 16 MG/DL (ref 5–40)
WBC # BLD AUTO: 4.33 10*3/MM3 (ref 3.4–10.8)

## 2021-04-01 ENCOUNTER — APPOINTMENT (OUTPATIENT)
Dept: MAMMOGRAPHY | Facility: HOSPITAL | Age: 66
End: 2021-04-01

## 2021-04-22 DIAGNOSIS — G47.00 INSOMNIA, UNSPECIFIED TYPE: ICD-10-CM

## 2021-04-22 RX ORDER — TEMAZEPAM 30 MG/1
CAPSULE ORAL
Qty: 30 CAPSULE | Refills: 1 | Status: SHIPPED | OUTPATIENT
Start: 2021-04-22 | End: 2021-05-03 | Stop reason: ALTCHOICE

## 2021-04-22 NOTE — TELEPHONE ENCOUNTER
PATIENT CALLED AND SAID THAT SHE IS OUT OF TEMAZEPAM. SHE SAID THAT SHE HAS NOT BEEN ABLE TO SLEEP IN 2 DAYS. PATIENT ASKED IF PRESCRIPTION COULD BE REFILLED TODAY.

## 2021-05-02 DIAGNOSIS — J30.1 ALLERGIC RHINITIS DUE TO POLLEN, UNSPECIFIED SEASONALITY: ICD-10-CM

## 2021-05-03 ENCOUNTER — TELEMEDICINE (OUTPATIENT)
Dept: FAMILY MEDICINE CLINIC | Facility: CLINIC | Age: 66
End: 2021-05-03

## 2021-05-03 ENCOUNTER — TELEPHONE (OUTPATIENT)
Dept: FAMILY MEDICINE CLINIC | Facility: CLINIC | Age: 66
End: 2021-05-03

## 2021-05-03 DIAGNOSIS — G47.00 INSOMNIA, UNSPECIFIED TYPE: Primary | ICD-10-CM

## 2021-05-03 PROCEDURE — 99213 OFFICE O/P EST LOW 20 MIN: CPT | Performed by: FAMILY MEDICINE

## 2021-05-03 RX ORDER — CETIRIZINE HYDROCHLORIDE 10 MG/1
TABLET ORAL
Qty: 30 TABLET | Refills: 0 | Status: SHIPPED | OUTPATIENT
Start: 2021-05-03 | End: 2021-06-30

## 2021-05-03 RX ORDER — TRAZODONE HYDROCHLORIDE 50 MG/1
25 TABLET ORAL NIGHTLY
Qty: 90 TABLET | Refills: 1 | Status: SHIPPED | OUTPATIENT
Start: 2021-05-03 | End: 2021-05-04

## 2021-05-03 NOTE — TELEPHONE ENCOUNTER
Caller: Karen Gaston    Relationship: Self    Best call back number: [phone]     What medication are you requesting: TRAZODONE    What are your current symptoms: SHE CANNOT SLEEP ON HER CURRENT SLEEP MEDICATIONS.  SHE IS NOT GETTING ANY SLEEP AND HAS A NEW JOB ON Wednesday.  SHE STATES SHE TRYS TO COUNT NUMBERS, TALK TO GOD, SHE STATES SHE ALSO TOOK MELATONIN BUT THEY DID NOT WORK EITHER.      How long have you been experiencing symptoms: MONTHS    Have you had these symptoms before:    [x] Yes  [] No    Have you been treated for these symptoms before:   [x] Yes  [] No    If a prescription is needed, what is your preferred pharmacy and phone number:    [facility] - [address] [facility] [address]  - [phone]  - [phone] FX

## 2021-05-03 NOTE — PROGRESS NOTES
Chief Complaint  No chief complaint on file.  Insomnia  Subjective          Karen Gaston presents to [facility]  History of Present Illness  Patient agreed to be contacted by Zul[facility]  Insomnia, x a year, tried Restoril, did not work, then melatonin, wants Trazodone , difficulty falling sleep and staying sleep, no caffeine  Objective   Vital Signs:   There were no vitals taken for this visit.      Result Review :                 Assessment and Plan    Diagnoses and all orders for this visit:    1. Insomnia, unspecified type (Primary)  -     traZODone (DESYREL) 50 MG tablet; Take 0.5 tablets by mouth Every Night for 180 days.  Dispense: 90 tablet; Refill: 1        Follow Up   No follow-ups on file.  Patient was given instructions and counseling regarding her condition or for health maintenance advice. Please see specific information pulled into the AVS if appropriate.     Time spent 22 minutes, discussed with patient interaction with Lexapro including but not limited to serotonin syndrome, patient aware about this, start with low-dose first, also discussed with patient that trazodone also work for depression, also avoid caffeinated drinks

## 2021-05-04 ENCOUNTER — TELEPHONE (OUTPATIENT)
Dept: FAMILY MEDICINE CLINIC | Facility: CLINIC | Age: 66
End: 2021-05-04

## 2021-05-04 DIAGNOSIS — G47.00 INSOMNIA, UNSPECIFIED TYPE: Primary | ICD-10-CM

## 2021-05-04 RX ORDER — ZOLPIDEM TARTRATE 5 MG/1
5 TABLET ORAL NIGHTLY PRN
Qty: 30 TABLET | Refills: 0 | Status: SHIPPED | OUTPATIENT
Start: 2021-05-04 | End: 2021-05-04

## 2021-05-04 RX ORDER — ZOLPIDEM TARTRATE 5 MG/1
5 TABLET ORAL NIGHTLY PRN
Qty: 30 TABLET | Refills: 0 | Status: SHIPPED | OUTPATIENT
Start: 2021-05-04 | End: 2021-05-12 | Stop reason: SDUPTHER

## 2021-05-04 NOTE — TELEPHONE ENCOUNTER
Caller: Karen Gaston    Relationship: Self    Best call back number: [phone]    What medications are you currently taking:   Current Outpatient Medications on File Prior to Visit   Medication Sig Dispense Refill   • ammonium lactate (AMLACTIN) 12 % cream Apply  topically to the appropriate area as directed As Needed for Dry Skin. 500 g 11   • atorvastatin (LIPITOR) 20 MG tablet Take 1 tablet by mouth Daily. 200001 90 tablet 3   • bumetanide (BUMEX) 2 MG tablet Take 1 tablet by mouth Daily. 30 tablet 11   • cetirizine (zyrTEC) 10 MG tablet TAKE 1 TABLET BY MOUTH EVERY DAY  30 tablet 0   • Chantix Continuing Month Robles 1 MG tablet TAKE ONE TABLET BY MOUTH TWICE A DAY 56 tablet 3   • escitalopram (Lexapro) 20 MG tablet Take 1 tablet by mouth Daily. 30 tablet 11   • losartan (COZAAR) 100 MG tablet Take 1 tablet by mouth Daily. 30 tablet 11   • Omega-3 Fatty Acids (FISH OIL) 1200 MG capsule capsule Take 2 capsules by mouth 2 (Two) Times a Day With Meals. 120 capsule 11   • omeprazole (priLOSEC) 40 MG capsule Take 1 capsule by mouth Daily. 30 capsule 11   • potassium chloride (K-DUR,KLOR-CON) 20 MEQ CR tablet Take 1 tablet by mouth Daily. 30 tablet 11   • traZODone (DESYREL) 50 MG tablet Take 0.5 tablets by mouth Every Night for 180 days. 90 tablet 1     No current facility-administered medications on file prior to visit.        When did you start taking these medications: 5/3/21    Which medication are you concerned about:TRAZODONE    Who prescribed you this medication:DR. SANDOVAL     What are your concerns: PATIENT FEELS LIKE THE MEDICATION IS INEFFECTIVE. PATIENT IS SUFFERING FROM RESTLESSNESS AND INSOMNIA. PATIENT FEELS LIKE SHE IS JUST GETTING THE SAME THING AS LEXAPRO! PATIENT IS INQUIRING ABOUT GETTING TEMAZEPAM AT A HIGHER DOSAGE.    How long have you been taking these medications: 1 DAY

## 2021-05-04 NOTE — TELEPHONE ENCOUNTER
When did you start taking these medications: 5/3/21     Which medication are you concerned about:TRAZODONE     Who prescribed you this medication:DR. SANDOVAL      What are your concerns: PATIENT FEELS LIKE THE MEDICATION IS INEFFECTIVE. PATIENT IS SUFFERING FROM RESTLESSNESS AND INSOMNIA. PATIENT FEELS LIKE SHE IS JUST GETTING THE SAME THING AS LEXAPRO! PATIENT IS INQUIRING ABOUT GETTING TEMAZEPAM AT A HIGHER DOSAGE.     How long have you been taking these medications: 1 DAY    Please advise

## 2021-05-12 DIAGNOSIS — G47.00 INSOMNIA, UNSPECIFIED TYPE: ICD-10-CM

## 2021-05-12 RX ORDER — ZOLPIDEM TARTRATE 5 MG/1
5 TABLET ORAL NIGHTLY PRN
Qty: 30 TABLET | Refills: 0 | Status: SHIPPED | OUTPATIENT
Start: 2021-05-12 | End: 2021-05-24 | Stop reason: SDUPTHER

## 2021-05-12 NOTE — TELEPHONE ENCOUNTER
Caller: Karen Gaston    Relationship: Self    Best call back number: [phone]    Medication needed:   Requested Prescriptions     Pending Prescriptions Disp Refills   • zolpidem (AMBIEN) 5 MG tablet 30 tablet 0     Sig: Take 1 tablet by mouth At Night As Needed for Sleep.       When do you need the refill by: ASAP    What additional details did the patient provide when requesting the medication: PATIENT WANTS TO CONTINUE AMBIEN BUT NEEDS A HIGHER DOSAGE. PHARMACY TOLD HER THEY HAVE IT IN 10 MG TABLETS. PATIENT WOULD LIKE THE 10 MG, 90 DAY SUPPLY IF POSSIBLE.    Does the patient have less than a 3 day supply:  [x] Yes  [] No    What is the patient's preferred pharmacy: [facility] [address] [place] [address] [address] AT [facility] [address] [address]  - [phone] [facility] [phone] FX

## 2021-05-24 ENCOUNTER — HOSPITAL ENCOUNTER (OUTPATIENT)
Dept: MAMMOGRAPHY | Facility: HOSPITAL | Age: 66
Discharge: HOME | End: 2021-05-24
Admitting: FAMILY MEDICINE

## 2021-05-24 ENCOUNTER — TELEPHONE (OUTPATIENT)
Dept: FAMILY MEDICINE CLINIC | Facility: CLINIC | Age: 66
End: 2021-05-24

## 2021-05-24 ENCOUNTER — TELEMEDICINE (OUTPATIENT)
Dept: FAMILY MEDICINE CLINIC | Facility: CLINIC | Age: 66
End: 2021-05-24

## 2021-05-24 DIAGNOSIS — Z00.00 HEALTH MAINTENANCE EXAMINATION: ICD-10-CM

## 2021-05-24 DIAGNOSIS — G47.00 INSOMNIA, UNSPECIFIED TYPE: ICD-10-CM

## 2021-05-24 DIAGNOSIS — Z12.31 VISIT FOR SCREENING MAMMOGRAM: ICD-10-CM

## 2021-05-24 PROCEDURE — 99213 OFFICE O/P EST LOW 20 MIN: CPT | Performed by: FAMILY MEDICINE

## 2021-05-24 PROCEDURE — 77063 BREAST TOMOSYNTHESIS BI: CPT

## 2021-05-24 PROCEDURE — 77067 SCR MAMMO BI INCL CAD: CPT

## 2021-05-24 RX ORDER — ZOLPIDEM TARTRATE 5 MG/1
7.5 TABLET ORAL NIGHTLY PRN
Qty: 45 TABLET | Refills: 2 | Status: SHIPPED | OUTPATIENT
Start: 2021-05-24 | End: 2021-08-23 | Stop reason: SDUPTHER

## 2021-05-24 RX ORDER — ZOLPIDEM TARTRATE 5 MG/1
5 TABLET ORAL NIGHTLY PRN
Qty: 30 TABLET | Refills: 0 | Status: CANCELLED | OUTPATIENT
Start: 2021-05-24

## 2021-05-24 NOTE — PROGRESS NOTES
Subjective   Karen Gaston is a 65 y.o. female.   Consent given    Time 20 min    [facility]      CC: insomnia follow up, Zolpidem at 5 mg is not working    History of Present Illness   nsomnia follow up, Zolpidem at 5 mg is not working      The following portions of the patient's history were reviewed and updated as appropriate: allergies, current medications, past family history, past medical history, past social history, past surgical history and problem list.    Past Medical History:   Diagnosis Date   • Blunt head trauma    • Blunt trauma of rib    • Breast cyst    • Chest pain    • Current every day smoker    • Depression    • Encounter for preventive health examination    • Esophageal reflux    • Fall    • Fatigue    • Heart disease    • High risk medication use    • Hyperlipidemia    • Hypertension    • Hypokalemia    • Insomnia    • Lump or mass in breast    • Refused influenza vaccine    • Rib contusion    • Rib pain on left side    • Seasonal allergies    • Thyroid disorder    • Visit for routine gyn exam        Past Surgical History:   Procedure Laterality Date   • HYSTERECTOMY         Family History   Problem Relation Age of Onset   • Depression Mother    • Hyperlipidemia Mother    • Hypertension Mother    • Heart disease Mother    • Kidney disease Mother    • Anxiety disorder Mother    • Other Mother         Convulsions   • Rheum arthritis Mother    • Osteoarthritis Mother    • Migraines Mother    • Diabetes Mother    • Stroke Mother    • Thyroid disease Mother    • Kidney disease Father    • Anxiety disorder Brother    • Bipolar disorder Brother    • Rheum arthritis Brother    • Depression Brother    • Asthma Brother    • Other Brother         Overweight   • Osteoarthritis Paternal Grandmother    • Diabetes Paternal Grandmother    • Anxiety disorder Paternal Grandmother    • Anxiety disorder Paternal Grandfather    • Stroke Paternal Grandfather    • Other Paternal Grandfather          Convulsions   • Heart disease Paternal Grandfather    • Hyperlipidemia Paternal Grandfather    • Hypertension Paternal Grandfather    • Kidney disease Paternal Grandfather    • Thyroid disease Paternal Grandfather    • Osteoarthritis Paternal Grandfather    • Asthma Paternal Grandfather    • No Known Problems Other        Social History     Socioeconomic History   • Marital status:      Spouse name: Not on file   • Number of children: Not on file   • Years of education: Not on file   • Highest education level: Not on file   Tobacco Use   • Smoking status: Former Smoker     Quit date: 2020     Years since quittin.4   • Smokeless tobacco: Never Used   Substance and Sexual Activity   • Alcohol use: Yes   • Drug use: Never   • Sexual activity: Yes       Current Outpatient Medications on File Prior to Visit   Medication Sig Dispense Refill   • ammonium lactate (AMLACTIN) 12 % cream Apply  topically to the appropriate area as directed As Needed for Dry Skin. 500 g 11   • atorvastatin (LIPITOR) 20 MG tablet Take 1 tablet by mouth Daily.  tablet 3   • bumetanide (BUMEX) 2 MG tablet Take 1 tablet by mouth Daily. 30 tablet 11   • cetirizine (zyrTEC) 10 MG tablet TAKE 1 TABLET BY MOUTH EVERY DAY  30 tablet 0   • Chantix Continuing Month Robles 1 MG tablet TAKE ONE TABLET BY MOUTH TWICE A DAY 56 tablet 3   • escitalopram (Lexapro) 20 MG tablet Take 1 tablet by mouth Daily. 30 tablet 11   • losartan (COZAAR) 100 MG tablet Take 1 tablet by mouth Daily. 30 tablet 11   • Omega-3 Fatty Acids (FISH OIL) 1200 MG capsule capsule Take 2 capsules by mouth 2 (Two) Times a Day With Meals. 120 capsule 11   • omeprazole (priLOSEC) 40 MG capsule Take 1 capsule by mouth Daily. 30 capsule 11   • potassium chloride (K-DUR,KLOR-CON) 20 MEQ CR tablet Take 1 tablet by mouth Daily. 30 tablet 11   • [DISCONTINUED] zolpidem (AMBIEN) 5 MG tablet Take 1 tablet by mouth At Night As Needed for Sleep. 30 tablet 0     No current  facility-administered medications on file prior to visit.       Review of Systems   Constitutional: Negative.    Psychiatric/Behavioral: Positive for sleep disturbance.       Recent Results (from the past 4704 hour(s))   Comprehensive Metabolic Panel    Collection Time: 03/16/21  9:18 AM    Specimen: Blood   Result Value Ref Range    Glucose 94 65 - 99 mg/dL    BUN 16 8 - 23 mg/dL    Creatinine 0.72 0.57 - 1.00 mg/dL    eGFR Non African Am 82 >60 mL/min/1.73    eGFR African Am 99 >60 mL/min/1.73    BUN/Creatinine Ratio 22.2 7.0 - 25.0    Sodium 143 136 - 145 mmol/L    Potassium 4.4 3.5 - 5.2 mmol/L    Chloride 104 98 - 107 mmol/L    Total CO2 29.2 (H) 22.0 - 29.0 mmol/L    Calcium 10.0 8.6 - 10.5 mg/dL    Total Protein 6.8 6.0 - 8.5 g/dL    Albumin 4.40 3.50 - 5.20 g/dL    Globulin 2.4 gm/dL    A/G Ratio 1.8 g/dL    Total Bilirubin 0.6 0.0 - 1.2 mg/dL    Alkaline Phosphatase 47 39 - 117 U/L    AST (SGOT) 40 (H) 1 - 32 U/L    ALT (SGPT) 40 (H) 1 - 33 U/L   Lipid Panel    Collection Time: 03/16/21  9:18 AM    Specimen: Blood   Result Value Ref Range    Total Cholesterol 239 (H) 0 - 200 mg/dL    Triglycerides 96 0 - 150 mg/dL    HDL Cholesterol 105 (H) 40 - 60 mg/dL    VLDL Cholesterol Fidel 16 5 - 40 mg/dL    LDL Chol Calc (NIH) 118 (H) 0 - 100 mg/dL   CBC & Differential    Collection Time: 03/16/21  9:18 AM    Specimen: Blood   Result Value Ref Range    WBC 4.33 3.40 - 10.80 10*3/mm3    RBC 4.09 3.77 - 5.28 10*6/mm3    Hemoglobin 12.0 12.0 - 15.9 g/dL    Hematocrit 37.5 34.0 - 46.6 %    MCV 91.7 79.0 - 97.0 fL    MCH 29.3 26.6 - 33.0 pg    MCHC 32.0 31.5 - 35.7 g/dL    RDW 11.7 (L) 12.3 - 15.4 %    Platelets 348 140 - 450 10*3/mm3    Neutrophil Rel % 65.0 42.7 - 76.0 %    Lymphocyte Rel % 19.6 19.6 - 45.3 %    Monocyte Rel % 12.9 (H) 5.0 - 12.0 %    Eosinophil Rel % 1.4 0.3 - 6.2 %    Basophil Rel % 0.9 0.0 - 1.5 %    Neutrophils Absolute 2.81 1.70 - 7.00 10*3/mm3    Lymphocytes Absolute 0.85 0.70 - 3.10 10*3/mm3     Monocytes Absolute 0.56 0.10 - 0.90 10*3/mm3    Eosinophils Absolute 0.06 0.00 - 0.40 10*3/mm3    Basophils Absolute 0.04 0.00 - 0.20 10*3/mm3    Immature Granulocyte Rel % 0.2 0.0 - 0.5 %    Immature Grans Absolute 0.01 0.00 - 0.05 10*3/mm3    nRBC 0.0 0.0 - 0.2 /100 WBC     Objective   There were no vitals filed for this visit.  There is no height or weight on file to calculate BMI.  Physical Exam  Constitutional:       Appearance: Normal appearance.   Neurological:      Mental Status: She is alert.           Diagnoses and all orders for this visit:    1. Insomnia, unspecified type  -     zolpidem (AMBIEN) 5 MG tablet; Take 1.5 tablets by mouth At Night As Needed for Sleep.  Dispense: 45 tablet; Refill: 2      Return in about 3 months (around 8/24/2021) for insomnia.    3 months follow up.

## 2021-05-24 NOTE — TELEPHONE ENCOUNTER
Caller: Karen Gaston    Relationship: Self    Best call back number: [phone]    Medication needed:   Requested Prescriptions     Pending Prescriptions Disp Refills   • zolpidem (AMBIEN) 5 MG tablet 30 tablet 0     Sig: Take 1 tablet by mouth At Night As Needed for Sleep.       When do you need the refill by: ASAP     What additional details did the patient provide when requesting the medication: PATIENT IS OUT. SHE IS TAKING 1.5 FOR THEM TO WORK.     Does the patient have less than a 3 day supply:  [x] Yes  [] No    What is the patient's preferred pharmacy: [facility] [place] [address] AT [facility] [address]  - [phone] [facility] [phone] FX

## 2021-06-30 DIAGNOSIS — J30.1 ALLERGIC RHINITIS DUE TO POLLEN, UNSPECIFIED SEASONALITY: ICD-10-CM

## 2021-06-30 RX ORDER — CETIRIZINE HYDROCHLORIDE 10 MG/1
TABLET ORAL
Qty: 30 TABLET | Refills: 0 | Status: SHIPPED | OUTPATIENT
Start: 2021-06-30 | End: 2021-07-26

## 2021-07-11 DIAGNOSIS — I10 ESSENTIAL HYPERTENSION: ICD-10-CM

## 2021-07-12 RX ORDER — POTASSIUM CHLORIDE 1500 MG/1
TABLET, EXTENDED RELEASE ORAL
Qty: 30 TABLET | Refills: 10 | Status: SHIPPED | OUTPATIENT
Start: 2021-07-12 | End: 2022-05-31 | Stop reason: SDUPTHER

## 2021-07-18 DIAGNOSIS — K21.9 GASTROESOPHAGEAL REFLUX DISEASE WITHOUT ESOPHAGITIS: ICD-10-CM

## 2021-07-18 DIAGNOSIS — E78.5 HYPERLIPIDEMIA, UNSPECIFIED HYPERLIPIDEMIA TYPE: ICD-10-CM

## 2021-07-18 DIAGNOSIS — I10 ESSENTIAL HYPERTENSION: ICD-10-CM

## 2021-07-19 RX ORDER — LOSARTAN POTASSIUM 50 MG/1
TABLET ORAL
Qty: 60 TABLET | Refills: 10 | Status: SHIPPED | OUTPATIENT
Start: 2021-07-19 | End: 2021-11-16 | Stop reason: SDUPTHER

## 2021-07-19 RX ORDER — OMEPRAZOLE 40 MG/1
CAPSULE, DELAYED RELEASE ORAL
Qty: 30 CAPSULE | Refills: 10 | Status: SHIPPED | OUTPATIENT
Start: 2021-07-19 | End: 2022-05-31 | Stop reason: SDUPTHER

## 2021-07-19 RX ORDER — ATORVASTATIN CALCIUM 20 MG/1
TABLET, FILM COATED ORAL
Qty: 30 TABLET | Refills: 2 | Status: SHIPPED | OUTPATIENT
Start: 2021-07-19 | End: 2021-10-20

## 2021-07-24 DIAGNOSIS — J30.1 ALLERGIC RHINITIS DUE TO POLLEN, UNSPECIFIED SEASONALITY: ICD-10-CM

## 2021-07-26 RX ORDER — CETIRIZINE HYDROCHLORIDE 10 MG/1
TABLET ORAL
Qty: 30 TABLET | Refills: 2 | Status: SHIPPED | OUTPATIENT
Start: 2021-07-26 | End: 2021-11-01

## 2021-07-30 DIAGNOSIS — R60.9 EDEMA, UNSPECIFIED TYPE: ICD-10-CM

## 2021-07-30 RX ORDER — BUMETANIDE 2 MG/1
TABLET ORAL
Qty: 30 TABLET | Refills: 11 | Status: SHIPPED | OUTPATIENT
Start: 2021-07-30 | End: 2022-05-31 | Stop reason: SDUPTHER

## 2021-08-03 DIAGNOSIS — F32.A DEPRESSION, UNSPECIFIED DEPRESSION TYPE: ICD-10-CM

## 2021-08-03 RX ORDER — ESCITALOPRAM OXALATE 20 MG/1
TABLET ORAL
Qty: 30 TABLET | Refills: 11 | Status: SHIPPED | OUTPATIENT
Start: 2021-08-03 | End: 2022-05-31 | Stop reason: SDUPTHER

## 2021-08-23 ENCOUNTER — TELEMEDICINE (OUTPATIENT)
Dept: FAMILY MEDICINE CLINIC | Facility: CLINIC | Age: 66
End: 2021-08-23

## 2021-08-23 DIAGNOSIS — G47.00 INSOMNIA, UNSPECIFIED TYPE: Primary | ICD-10-CM

## 2021-08-23 DIAGNOSIS — Z79.899 HIGH RISK MEDICATION USE: ICD-10-CM

## 2021-08-23 PROCEDURE — 99213 OFFICE O/P EST LOW 20 MIN: CPT | Performed by: FAMILY MEDICINE

## 2021-08-23 RX ORDER — ZOLPIDEM TARTRATE 5 MG/1
7.5 TABLET ORAL NIGHTLY PRN
Qty: 45 TABLET | Refills: 2 | Status: SHIPPED | OUTPATIENT
Start: 2021-08-23 | End: 2021-11-16 | Stop reason: SDUPTHER

## 2021-08-23 NOTE — PROGRESS NOTES
Subjective   Karen Gaston is a 65 y.o. female.   Consent given    Time 20 min    Visit via [facility]    Cc; insomnia follow up, much improved    History of Present Illness      insomnia follow up, much improved    The following portions of the patient's history were reviewed and updated as appropriate: allergies, current medications, past family history, past medical history, past social history, past surgical history and problem list.    Past Medical History:   Diagnosis Date   • Blunt head trauma    • Blunt trauma of rib    • Breast cyst    • Chest pain    • Current every day smoker    • Depression    • Encounter for preventive health examination    • Esophageal reflux    • Fall    • Fatigue    • Heart disease    • High risk medication use    • Hyperlipidemia    • Hypertension    • Hypokalemia    • Insomnia    • Lump or mass in breast    • Refused influenza vaccine    • Rib contusion    • Rib pain on left side    • Seasonal allergies    • Thyroid disorder    • Visit for routine gyn exam        Past Surgical History:   Procedure Laterality Date   • HYSTERECTOMY         Family History   Problem Relation Age of Onset   • Depression Mother    • Hyperlipidemia Mother    • Hypertension Mother    • Heart disease Mother    • Kidney disease Mother    • Anxiety disorder Mother    • Other Mother         Convulsions   • Rheum arthritis Mother    • Osteoarthritis Mother    • Migraines Mother    • Diabetes Mother    • Stroke Mother    • Thyroid disease Mother    • Kidney disease Father    • Anxiety disorder Brother    • Bipolar disorder Brother    • Rheum arthritis Brother    • Depression Brother    • Asthma Brother    • Other Brother         Overweight   • Osteoarthritis Paternal Grandmother    • Diabetes Paternal Grandmother    • Anxiety disorder Paternal Grandmother    • Anxiety disorder Paternal Grandfather    • Stroke Paternal Grandfather    • Other Paternal Grandfather         Convulsions   • Heart disease  Paternal Grandfather    • Hyperlipidemia Paternal Grandfather    • Hypertension Paternal Grandfather    • Kidney disease Paternal Grandfather    • Thyroid disease Paternal Grandfather    • Osteoarthritis Paternal Grandfather    • Asthma Paternal Grandfather    • No Known Problems Other        Social History     Socioeconomic History   • Marital status:      Spouse name: Not on file   • Number of children: Not on file   • Years of education: Not on file   • Highest education level: Not on file   Tobacco Use   • Smoking status: Former Smoker     Quit date: 2020     Years since quittin.6   • Smokeless tobacco: Never Used   Substance and Sexual Activity   • Alcohol use: Yes   • Drug use: Never   • Sexual activity: Yes       Current Outpatient Medications on File Prior to Visit   Medication Sig Dispense Refill   • ammonium lactate (AMLACTIN) 12 % cream Apply  topically to the appropriate area as directed As Needed for Dry Skin. 500 g 11   • atorvastatin (LIPITOR) 20 MG tablet TAKE ONE TABLET BY MOUTH DAILY 30 tablet 2   • bumetanide (BUMEX) 2 MG tablet TAKE ONE TABLET BY MOUTH DAILY 30 tablet 11   • cetirizine (zyrTEC) 10 MG tablet TAKE 1 TABLET BY MOUTH EVERY DAY  30 tablet 2   • Chantix Continuing Month Robles 1 MG tablet TAKE ONE TABLET BY MOUTH TWICE A DAY 56 tablet 3   • escitalopram (LEXAPRO) 20 MG tablet TAKE ONE TABLET BY MOUTH DAILY 30 tablet 11   • KLOR-CON 20 MEQ CR tablet TAKE ONE TABLET BY MOUTH DAILY 30 tablet 10   • losartan (COZAAR) 50 MG tablet TAKE TWO TABLETS BY MOUTH DAILY 60 tablet 10   • Omega-3 Fatty Acids (FISH OIL) 1200 MG capsule capsule Take 2 capsules by mouth 2 (Two) Times a Day With Meals. 120 capsule 11   • omeprazole (priLOSEC) 40 MG capsule TAKE ONE CAPSULE BY MOUTH DAILY 30 capsule 10   • [DISCONTINUED] zolpidem (AMBIEN) 5 MG tablet Take 1.5 tablets by mouth At Night As Needed for Sleep. 45 tablet 2     No current facility-administered medications on file prior to visit.        Review of Systems   Constitutional: Negative.    Psychiatric/Behavioral: Positive for sleep disturbance.       Recent Results (from the past 4704 hour(s))   Comprehensive Metabolic Panel    Collection Time: 03/16/21  9:18 AM    Specimen: Blood   Result Value Ref Range    Glucose 94 65 - 99 mg/dL    BUN 16 8 - 23 mg/dL    Creatinine 0.72 0.57 - 1.00 mg/dL    eGFR Non African Am 82 >60 mL/min/1.73    eGFR African Am 99 >60 mL/min/1.73    BUN/Creatinine Ratio 22.2 7.0 - 25.0    Sodium 143 136 - 145 mmol/L    Potassium 4.4 3.5 - 5.2 mmol/L    Chloride 104 98 - 107 mmol/L    Total CO2 29.2 (H) 22.0 - 29.0 mmol/L    Calcium 10.0 8.6 - 10.5 mg/dL    Total Protein 6.8 6.0 - 8.5 g/dL    Albumin 4.40 3.50 - 5.20 g/dL    Globulin 2.4 gm/dL    A/G Ratio 1.8 g/dL    Total Bilirubin 0.6 0.0 - 1.2 mg/dL    Alkaline Phosphatase 47 39 - 117 U/L    AST (SGOT) 40 (H) 1 - 32 U/L    ALT (SGPT) 40 (H) 1 - 33 U/L   Lipid Panel    Collection Time: 03/16/21  9:18 AM    Specimen: Blood   Result Value Ref Range    Total Cholesterol 239 (H) 0 - 200 mg/dL    Triglycerides 96 0 - 150 mg/dL    HDL Cholesterol 105 (H) 40 - 60 mg/dL    VLDL Cholesterol Fidel 16 5 - 40 mg/dL    LDL Chol Calc (NIH) 118 (H) 0 - 100 mg/dL   CBC & Differential    Collection Time: 03/16/21  9:18 AM    Specimen: Blood   Result Value Ref Range    WBC 4.33 3.40 - 10.80 10*3/mm3    RBC 4.09 3.77 - 5.28 10*6/mm3    Hemoglobin 12.0 12.0 - 15.9 g/dL    Hematocrit 37.5 34.0 - 46.6 %    MCV 91.7 79.0 - 97.0 fL    MCH 29.3 26.6 - 33.0 pg    MCHC 32.0 31.5 - 35.7 g/dL    RDW 11.7 (L) 12.3 - 15.4 %    Platelets 348 140 - 450 10*3/mm3    Neutrophil Rel % 65.0 42.7 - 76.0 %    Lymphocyte Rel % 19.6 19.6 - 45.3 %    Monocyte Rel % 12.9 (H) 5.0 - 12.0 %    Eosinophil Rel % 1.4 0.3 - 6.2 %    Basophil Rel % 0.9 0.0 - 1.5 %    Neutrophils Absolute 2.81 1.70 - 7.00 10*3/mm3    Lymphocytes Absolute 0.85 0.70 - 3.10 10*3/mm3    Monocytes Absolute 0.56 0.10 - 0.90 10*3/mm3    Eosinophils  Absolute 0.06 0.00 - 0.40 10*3/mm3    Basophils Absolute 0.04 0.00 - 0.20 10*3/mm3    Immature Granulocyte Rel % 0.2 0.0 - 0.5 %    Immature Grans Absolute 0.01 0.00 - 0.05 10*3/mm3    nRBC 0.0 0.0 - 0.2 /100 WBC     Objective   There were no vitals filed for this visit.  There is no height or weight on file to calculate BMI.  Physical Exam  Constitutional:       Appearance: Normal appearance.   Neurological:      Mental Status: She is alert.           Diagnoses and all orders for this visit:    1. High risk medication use (Primary)    2. Insomnia, unspecified type  -     zolpidem (AMBIEN) 5 MG tablet; Take 1.5 tablets by mouth At Night As Needed for Sleep.  Dispense: 45 tablet; Refill: 2

## 2021-10-20 DIAGNOSIS — E78.5 HYPERLIPIDEMIA, UNSPECIFIED HYPERLIPIDEMIA TYPE: ICD-10-CM

## 2021-10-20 RX ORDER — ATORVASTATIN CALCIUM 20 MG/1
TABLET, FILM COATED ORAL
Qty: 30 TABLET | Refills: 2 | Status: SHIPPED | OUTPATIENT
Start: 2021-10-20 | End: 2022-01-21

## 2021-10-20 NOTE — TELEPHONE ENCOUNTER
Rx Refill Note  Requested Prescriptions     Pending Prescriptions Disp Refills   • atorvastatin (LIPITOR) 20 MG tablet [Pharmacy Med Name: ATORVASTATIN 20 MG TABLET] 30 tablet 2     Sig: TAKE ONE TABLET BY MOUTH DAILY      Last office visit with prescribing clinician: 3/16/2021      Next office visit with prescribing clinician: 11/16/2021            Madison Soliman MA  10/20/21, 17:20 EDT

## 2021-10-31 DIAGNOSIS — J30.1 ALLERGIC RHINITIS DUE TO POLLEN, UNSPECIFIED SEASONALITY: ICD-10-CM

## 2021-11-01 RX ORDER — CETIRIZINE HYDROCHLORIDE 10 MG/1
TABLET ORAL
Qty: 30 TABLET | Refills: 0 | Status: SHIPPED | OUTPATIENT
Start: 2021-11-01 | End: 2021-11-08

## 2021-11-05 ENCOUNTER — TELEPHONE (OUTPATIENT)
Dept: FAMILY MEDICINE CLINIC | Facility: CLINIC | Age: 66
End: 2021-11-05

## 2021-11-05 NOTE — TELEPHONE ENCOUNTER
PATIENT IS CALLING TO REQUEST AN EMOTIONAL SUPPORT LETTER FOR HER DOG. SHE STATES THE RENT FOR HER DOG IS GOING UP FROM 20.00 A MONTH TO 40.00 A MONTH.     LETTER NEEDS TO SAY SHE IS A PATIENT OF DR. SANDOVAL AND SUFFERS FROM MENTAL ILLNESS. SHE TAKES LEXAPRO EVERYDAY. HER DOG ALESSANDRO IS HER SUPPORT.    SHE HAS BEEN LIVING THERE FOR A YEAR 12/1/21, AND THE NEW INCREASE WILL TAKE EFFECT THEN. SHE IS HOPING TO GET HER DEPOSIT BACK AS WELL.     SHE WILL  LETTER AT HER NEXT APPOINTMENT ON 11/16/21    CALL BACK NUMBER [phone]

## 2021-11-07 DIAGNOSIS — J30.1 ALLERGIC RHINITIS DUE TO POLLEN, UNSPECIFIED SEASONALITY: ICD-10-CM

## 2021-11-08 RX ORDER — CETIRIZINE HYDROCHLORIDE 10 MG/1
TABLET ORAL
Qty: 30 TABLET | Refills: 11 | Status: SHIPPED | OUTPATIENT
Start: 2021-11-08 | End: 2021-11-16 | Stop reason: SDUPTHER

## 2021-11-16 ENCOUNTER — TELEPHONE (OUTPATIENT)
Dept: FAMILY MEDICINE CLINIC | Facility: CLINIC | Age: 66
End: 2021-11-16

## 2021-11-16 ENCOUNTER — OFFICE VISIT (OUTPATIENT)
Dept: FAMILY MEDICINE CLINIC | Facility: CLINIC | Age: 66
End: 2021-11-16

## 2021-11-16 VITALS
TEMPERATURE: 97.3 F | HEART RATE: 78 BPM | SYSTOLIC BLOOD PRESSURE: 152 MMHG | WEIGHT: 148.38 LBS | OXYGEN SATURATION: 100 % | DIASTOLIC BLOOD PRESSURE: 88 MMHG | BODY MASS INDEX: 24.72 KG/M2 | HEIGHT: 65 IN

## 2021-11-16 DIAGNOSIS — E78.5 HYPERLIPIDEMIA, UNSPECIFIED HYPERLIPIDEMIA TYPE: ICD-10-CM

## 2021-11-16 DIAGNOSIS — G47.00 INSOMNIA, UNSPECIFIED TYPE: ICD-10-CM

## 2021-11-16 DIAGNOSIS — J30.1 ALLERGIC RHINITIS DUE TO POLLEN, UNSPECIFIED SEASONALITY: ICD-10-CM

## 2021-11-16 DIAGNOSIS — Z78.0 POSTMENOPAUSAL: ICD-10-CM

## 2021-11-16 DIAGNOSIS — I10 PRIMARY HYPERTENSION: ICD-10-CM

## 2021-11-16 DIAGNOSIS — I10 ESSENTIAL HYPERTENSION: ICD-10-CM

## 2021-11-16 DIAGNOSIS — Z79.899 HIGH RISK MEDICATION USE: ICD-10-CM

## 2021-11-16 DIAGNOSIS — Z23 IMMUNIZATION DUE: Primary | ICD-10-CM

## 2021-11-16 PROCEDURE — 90686 IIV4 VACC NO PRSV 0.5 ML IM: CPT | Performed by: FAMILY MEDICINE

## 2021-11-16 PROCEDURE — 99214 OFFICE O/P EST MOD 30 MIN: CPT | Performed by: FAMILY MEDICINE

## 2021-11-16 PROCEDURE — 90471 IMMUNIZATION ADMIN: CPT | Performed by: FAMILY MEDICINE

## 2021-11-16 RX ORDER — ZOLPIDEM TARTRATE 5 MG/1
7.5 TABLET ORAL NIGHTLY PRN
Qty: 45 TABLET | Refills: 3 | Status: SHIPPED | OUTPATIENT
Start: 2021-11-16 | End: 2022-02-24 | Stop reason: SDUPTHER

## 2021-11-16 RX ORDER — CETIRIZINE HYDROCHLORIDE 10 MG/1
10 TABLET ORAL DAILY
Qty: 30 TABLET | Refills: 11 | Status: SHIPPED | OUTPATIENT
Start: 2021-11-16 | End: 2022-09-21 | Stop reason: SDUPTHER

## 2021-11-16 RX ORDER — LOSARTAN POTASSIUM 50 MG/1
100 TABLET ORAL DAILY
Qty: 60 TABLET | Refills: 10 | Status: SHIPPED | OUTPATIENT
Start: 2021-11-16 | End: 2022-09-21 | Stop reason: SDUPTHER

## 2021-11-16 NOTE — TELEPHONE ENCOUNTER
Caller: Karen Gaston    Relationship: Self    Best call back number:[phone]    What form or medical record are you requesting: LETTER FOR EMOTIONAL SUPPORT ANIMAL     How would you like to receive the form or medical records (pick-up, mail, fax): MAIL    If mail, what is the address: [address] [place], [place] [id number]    Timeframe paperwork needed: ASAP

## 2021-11-16 NOTE — PROGRESS NOTES
Subjective   Karen Gaston is a 66 y.o. female.     Chief Complaint   Patient presents with   • Follow-up   • Med Refill     zolpidem 3 month       History of Present Illness   Hypertension follow-up, it is elevated today.  Patient stopped smoking and gained quite a lot of weight over 15 pounds.  Therefore her blood pressure is elevated.  Hyperlipidemia stable.  Labs today.  Allergies stable.  Insomnia stable.  On current medications..  Labs today      The following portions of the patient's history were reviewed and updated as appropriate: allergies, current medications, past family history, past medical history, past social history, past surgical history and problem list.    Past Medical History:   Diagnosis Date   • Blunt head trauma    • Blunt trauma of rib    • Breast cyst    • Chest pain    • Current every day smoker    • Depression    • Encounter for preventive health examination    • Esophageal reflux    • Fall    • Fatigue    • Heart disease    • High risk medication use    • Hyperlipidemia    • Hypertension    • Hypokalemia    • Insomnia    • Lump or mass in breast    • Refused influenza vaccine    • Rib contusion    • Rib pain on left side    • Seasonal allergies    • Thyroid disorder    • Visit for routine gyn exam        Past Surgical History:   Procedure Laterality Date   • HYSTERECTOMY         Family History   Problem Relation Age of Onset   • Depression Mother    • Hyperlipidemia Mother    • Hypertension Mother    • Heart disease Mother    • Kidney disease Mother    • Anxiety disorder Mother    • Other Mother         Convulsions   • Rheum arthritis Mother    • Osteoarthritis Mother    • Migraines Mother    • Diabetes Mother    • Stroke Mother    • Thyroid disease Mother    • Kidney disease Father    • Anxiety disorder Brother    • Bipolar disorder Brother    • Rheum arthritis Brother    • Depression Brother    • Asthma Brother    • Other Brother         Overweight   • Osteoarthritis Paternal  Grandmother    • Diabetes Paternal Grandmother    • Anxiety disorder Paternal Grandmother    • Anxiety disorder Paternal Grandfather    • Stroke Paternal Grandfather    • Other Paternal Grandfather         Convulsions   • Heart disease Paternal Grandfather    • Hyperlipidemia Paternal Grandfather    • Hypertension Paternal Grandfather    • Kidney disease Paternal Grandfather    • Thyroid disease Paternal Grandfather    • Osteoarthritis Paternal Grandfather    • Asthma Paternal Grandfather    • No Known Problems Other        Social History     Socioeconomic History   • Marital status:    Tobacco Use   • Smoking status: Former Smoker     Quit date: 2020     Years since quittin.9   • Smokeless tobacco: Never Used   Substance and Sexual Activity   • Alcohol use: Yes   • Drug use: Never   • Sexual activity: Yes       Current Outpatient Medications on File Prior to Visit   Medication Sig Dispense Refill   • ammonium lactate (AMLACTIN) 12 % cream Apply  topically to the appropriate area as directed As Needed for Dry Skin. 500 g 11   • atorvastatin (LIPITOR) 20 MG tablet TAKE ONE TABLET BY MOUTH DAILY 30 tablet 2   • bumetanide (BUMEX) 2 MG tablet TAKE ONE TABLET BY MOUTH DAILY 30 tablet 11   • escitalopram (LEXAPRO) 20 MG tablet TAKE ONE TABLET BY MOUTH DAILY 30 tablet 11   • KLOR-CON 20 MEQ CR tablet TAKE ONE TABLET BY MOUTH DAILY 30 tablet 10   • Omega-3 Fatty Acids (FISH OIL) 1200 MG capsule capsule Take 2 capsules by mouth 2 (Two) Times a Day With Meals. 120 capsule 11   • omeprazole (priLOSEC) 40 MG capsule TAKE ONE CAPSULE BY MOUTH DAILY 30 capsule 10   • [DISCONTINUED] cetirizine (zyrTEC) 10 MG tablet TAKE 1 TABLET BY MOUTH EVERY DAY 30 tablet 11   • [DISCONTINUED] Chantix Continuing Month Robles 1 MG tablet TAKE ONE TABLET BY MOUTH TWICE A DAY 56 tablet 3   • [DISCONTINUED] losartan (COZAAR) 50 MG tablet TAKE TWO TABLETS BY MOUTH DAILY 60 tablet 10   • [DISCONTINUED] zolpidem (AMBIEN) 5 MG tablet  "Take 1.5 tablets by mouth At Night As Needed for Sleep. 45 tablet 2     No current facility-administered medications on file prior to visit.       Review of Systems   Constitutional: Negative.        No results found for this or any previous visit (from the past 4704 hour(s)).  Objective   Vitals:    11/16/21 0946   BP: 152/88   Pulse: 78   Temp: 97.3 °F (36.3 °C)   SpO2: 100%   Weight: 67.3 kg (148 lb 6 oz)   Height: 165 cm (64.96\")     Body mass index is 24.72 kg/m².  Physical Exam  Vitals and nursing note reviewed.   Constitutional:       General: She is not in acute distress.     Appearance: She is well-developed. She is not diaphoretic.   Cardiovascular:      Rate and Rhythm: Normal rate and regular rhythm.   Pulmonary:      Effort: Pulmonary effort is normal. No respiratory distress.      Breath sounds: Normal breath sounds. No wheezing.           Diagnoses and all orders for this visit:    1. Immunization due (Primary)  -     COVID-19 Vaccine (Pfizer)  -     FluLaval/Fluarix/Fluzone >6 Months ([id number])    2. Insomnia, unspecified type  Comments:  much improved on current dose  refills today  Orders:  -     zolpidem (AMBIEN) 5 MG tablet; Take 1.5 tablets by mouth At Night As Needed for Sleep.  Dispense: 45 tablet; Refill: 3    3. Essential hypertension  -     losartan (COZAAR) 50 MG tablet; Take 2 tablets by mouth Daily.  Dispense: 60 tablet; Refill: 10  -     Comprehensive Metabolic Panel  -     Lipid Panel  -     CBC & Differential    4. Allergic rhinitis due to pollen, unspecified seasonality  -     cetirizine (zyrTEC) 10 MG tablet; Take 1 tablet by mouth Daily.  Dispense: 30 tablet; Refill: 11    5. Hyperlipidemia, unspecified hyperlipidemia type  -     Comprehensive Metabolic Panel  -     Lipid Panel  -     CBC & Differential    6. Postmenopausal  -     DEXA Bone Density Axial; Future    7. High risk medication use    8. Primary hypertension      Continue all other current medications for stable chronic " medical conditions  Return in about 2 months (around 1/16/2022) for HYPERTENSION.

## 2022-01-21 DIAGNOSIS — E78.5 HYPERLIPIDEMIA, UNSPECIFIED HYPERLIPIDEMIA TYPE: ICD-10-CM

## 2022-01-21 RX ORDER — ATORVASTATIN CALCIUM 20 MG/1
TABLET, FILM COATED ORAL
Qty: 30 TABLET | Refills: 2 | Status: SHIPPED | OUTPATIENT
Start: 2022-01-21 | End: 2022-02-24 | Stop reason: SDUPTHER

## 2022-01-21 NOTE — TELEPHONE ENCOUNTER
Rx Refill Note  Requested Prescriptions     Pending Prescriptions Disp Refills   • atorvastatin (LIPITOR) 20 MG tablet [Pharmacy Med Name: ATORVASTATIN 20 MG TABLET] 30 tablet 2     Sig: TAKE ONE TABLET BY MOUTH DAILY      Last office visit with prescribing clinician: 11/16/2021      Next office visit with prescribing clinician: 2/17/2022   Last filled 10/20/2021           Esme Londono MA  01/21/22, 11:56 EST

## 2022-02-22 ENCOUNTER — TELEPHONE (OUTPATIENT)
Dept: NEUROSURGERY | Facility: CLINIC | Age: 67
End: 2022-02-22

## 2022-02-22 NOTE — TELEPHONE ENCOUNTER
PATIENT CALLED REQUESTING TO SCHEDULE AN APPT WITH DR. MCPHERSON. LAST SEEN 2014. INFORMED HER DUE TO IT BEING OVER 3 YEARS WE WILL REQUIRE A NEW REFERRAL AND UPDATED IMG. PATIENT UNDERSTOOD AND IS GOING TO CONTACT HER PCP.

## 2022-02-24 ENCOUNTER — OFFICE VISIT (OUTPATIENT)
Dept: FAMILY MEDICINE CLINIC | Facility: CLINIC | Age: 67
End: 2022-02-24

## 2022-02-24 VITALS
BODY MASS INDEX: 24.76 KG/M2 | SYSTOLIC BLOOD PRESSURE: 138 MMHG | HEART RATE: 86 BPM | HEIGHT: 65 IN | WEIGHT: 148.6 LBS | TEMPERATURE: 98.6 F | OXYGEN SATURATION: 95 % | DIASTOLIC BLOOD PRESSURE: 92 MMHG

## 2022-02-24 DIAGNOSIS — M54.50 LUMBAR PAIN: ICD-10-CM

## 2022-02-24 DIAGNOSIS — G89.29 HIP PAIN, CHRONIC, RIGHT: ICD-10-CM

## 2022-02-24 DIAGNOSIS — E78.5 HYPERLIPIDEMIA, UNSPECIFIED HYPERLIPIDEMIA TYPE: ICD-10-CM

## 2022-02-24 DIAGNOSIS — Z23 IMMUNIZATION DUE: ICD-10-CM

## 2022-02-24 DIAGNOSIS — M25.551 HIP PAIN, CHRONIC, RIGHT: ICD-10-CM

## 2022-02-24 DIAGNOSIS — Z71.6 ENCOUNTER FOR SMOKING CESSATION COUNSELING: ICD-10-CM

## 2022-02-24 DIAGNOSIS — Z79.899 HIGH RISK MEDICATION USE: ICD-10-CM
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
## 2022-02-24 DIAGNOSIS — I10 ESSENTIAL HYPERTENSION: Primary | ICD-10-CM

## 2022-02-24 DIAGNOSIS — K21.9 GASTROESOPHAGEAL REFLUX DISEASE WITHOUT ESOPHAGITIS: ICD-10-CM

## 2022-02-24 DIAGNOSIS — G47.00 INSOMNIA, UNSPECIFIED TYPE: ICD-10-CM

## 2022-02-24 PROCEDURE — 99214 OFFICE O/P EST MOD 30 MIN: CPT | Performed by: FAMILY MEDICINE

## 2022-02-24 PROCEDURE — 90471 IMMUNIZATION ADMIN: CPT | Performed by: FAMILY MEDICINE

## 2022-02-24 PROCEDURE — 90750 HZV VACC RECOMBINANT IM: CPT | Performed by: FAMILY MEDICINE

## 2022-02-24 RX ORDER — BACLOFEN 10 MG/1
10 TABLET ORAL
Qty: 30 TABLET | Refills: 3 | Status: SHIPPED | OUTPATIENT
Start: 2022-02-24 | End: 2022-05-06 | Stop reason: SDUPTHER

## 2022-02-24 RX ORDER — VARENICLINE TARTRATE 1 MG/1
1 TABLET, FILM COATED ORAL 2 TIMES DAILY
Qty: 56 TABLET | Refills: 4 | Status: SHIPPED | OUTPATIENT
Start: 2022-03-24 | End: 2022-05-31 | Stop reason: SDUPTHER

## 2022-02-24 RX ORDER — ZOLPIDEM TARTRATE 5 MG/1
7.5 TABLET ORAL NIGHTLY PRN
Qty: 45 TABLET | Refills: 3 | Status: SHIPPED | OUTPATIENT
Start: 2022-02-24 | End: 2022-05-06 | Stop reason: SDUPTHER

## 2022-02-24 RX ORDER — ATORVASTATIN CALCIUM 20 MG/1
20 TABLET, FILM COATED ORAL DAILY
Qty: 30 TABLET | Refills: 11 | Status: SHIPPED | OUTPATIENT
Start: 2022-02-24 | End: 2022-05-31 | Stop reason: SDUPTHER

## 2022-02-24 NOTE — PROGRESS NOTES
"Subjective   Karen Arora is a 66 y.o. female.     Chief Complaint   Patient presents with   • Hypertension   • Allergies   • Back Pain   • Lower Extremity Issue       History of Present Illness   Patient complains on a fall she sustained at home 2 months ago, probably tripped over something at night.  Since then she has been having exacerbated lumbar back pain and a new pain on her right hip.  He did not seek any medical attention but still has difficulty with transferring from sitting to standing position with her hip \"catching\".  Hypertension follow-up today stable on current medications.  Hyperlipidemia follow-up.  Due for labs.  GERD follow-up stable.  Insomnia follow-up stable.  Meds refilled today  Also wants to restart her Chantix for smoking cessation she is back to smoking about 3 cigarettes a day  The following portions of the patient's history were reviewed and updated as appropriate: allergies, current medications, past family history, past medical history, past social history, past surgical history and problem list.    Past Medical History:   Diagnosis Date   • Blunt head trauma    • Blunt trauma of rib    • Breast cyst    • Chest pain    • Current every day smoker    • Depression    • Encounter for preventive health examination    • Esophageal reflux    • Fall    • Fatigue    • Heart disease    • High risk medication use    • Hyperlipidemia    • Hypertension    • Hypokalemia    • Insomnia    • Lump or mass in breast    • Refused influenza vaccine    • Rib contusion    • Rib pain on left side    • Seasonal allergies    • Thyroid disorder    • Visit for routine gyn exam        Past Surgical History:   Procedure Laterality Date   • HYSTERECTOMY         Family History   Problem Relation Age of Onset   • Depression Mother    • Hyperlipidemia Mother    • Hypertension Mother    • Heart disease Mother    • Kidney disease Mother    • Anxiety disorder Mother    • Other Mother         Convulsions   • " Rheum arthritis Mother    • Osteoarthritis Mother    • Migraines Mother    • Diabetes Mother    • Stroke Mother    • Thyroid disease Mother    • Kidney disease Father    • Anxiety disorder Brother    • Bipolar disorder Brother    • Rheum arthritis Brother    • Depression Brother    • Asthma Brother    • Other Brother         Overweight   • Osteoarthritis Paternal Grandmother    • Diabetes Paternal Grandmother    • Anxiety disorder Paternal Grandmother    • Anxiety disorder Paternal Grandfather    • Stroke Paternal Grandfather    • Other Paternal Grandfather         Convulsions   • Heart disease Paternal Grandfather    • Hyperlipidemia Paternal Grandfather    • Hypertension Paternal Grandfather    • Kidney disease Paternal Grandfather    • Thyroid disease Paternal Grandfather    • Osteoarthritis Paternal Grandfather    • Asthma Paternal Grandfather    • No Known Problems Other        Social History     Socioeconomic History   • Marital status:    Tobacco Use   • Smoking status: Former Smoker     Quit date: 2020     Years since quittin.1   • Smokeless tobacco: Never Used   Substance and Sexual Activity   • Alcohol use: Yes   • Drug use: Never   • Sexual activity: Yes       Current Outpatient Medications on File Prior to Visit   Medication Sig Dispense Refill   • ammonium lactate (AMLACTIN) 12 % cream Apply  topically to the appropriate area as directed As Needed for Dry Skin. 500 g 11   • bumetanide (BUMEX) 2 MG tablet TAKE ONE TABLET BY MOUTH DAILY 30 tablet 11   • cetirizine (zyrTEC) 10 MG tablet Take 1 tablet by mouth Daily. 30 tablet 11   • escitalopram (LEXAPRO) 20 MG tablet TAKE ONE TABLET BY MOUTH DAILY 30 tablet 11   • KLOR-CON 20 MEQ CR tablet TAKE ONE TABLET BY MOUTH DAILY 30 tablet 10   • losartan (COZAAR) 50 MG tablet Take 2 tablets by mouth Daily. 60 tablet 10   • Omega-3 Fatty Acids (FISH OIL) 1200 MG capsule capsule Take 2 capsules by mouth 2 (Two) Times a Day With Meals. 120 capsule  "11   • omeprazole (priLOSEC) 40 MG capsule TAKE ONE CAPSULE BY MOUTH DAILY 30 capsule 10   • [DISCONTINUED] atorvastatin (LIPITOR) 20 MG tablet TAKE ONE TABLET BY MOUTH DAILY 30 tablet 2   • [DISCONTINUED] zolpidem (AMBIEN) 5 MG tablet Take 1.5 tablets by mouth At Night As Needed for Sleep. 45 tablet 3     No current facility-administered medications on file prior to visit.       Review of Systems   Constitutional: Negative.    Musculoskeletal: Positive for arthralgias and back pain.       No results found for this or any previous visit (from the past 4704 hour(s)).  Objective   Vitals:    02/24/22 1027   BP: 138/92   BP Location: Right arm   Patient Position: Sitting   Pulse: 86   Temp: 98.6 °F (37 °C)   SpO2: 95%   Weight: 67.4 kg (148 lb 9.6 oz)   Height: 165 cm (64.96\")     Body mass index is 24.76 kg/m².  Physical Exam  Vitals and nursing note reviewed.   Constitutional:       General: She is not in acute distress.     Appearance: She is well-developed. She is not diaphoretic.   Cardiovascular:      Rate and Rhythm: Normal rate and regular rhythm.   Pulmonary:      Effort: Pulmonary effort is normal. No respiratory distress.      Breath sounds: Normal breath sounds. No wheezing.   Musculoskeletal:      Comments: Negative straight leg raising test bilaterally.  On the right hip she does have pain with abduction           Diagnoses and all orders for this visit:    1. Essential hypertension (Primary)  -     Comprehensive Metabolic Panel  -     Lipid Panel  -     CBC & Differential    2. Hyperlipidemia, unspecified hyperlipidemia type  -     Comprehensive Metabolic Panel  -     Lipid Panel  -     CBC & Differential  -     atorvastatin (LIPITOR) 20 MG tablet; Take 1 tablet by mouth Daily.  Dispense: 30 tablet; Refill: 11    3. High risk medication use    4. Fall, initial encounter  -     XR Hip With or Without Pelvis 2 - 3 View Right; Future  -     XR Spine Lumbar 2 or 3 View; Future    5. Hip pain, chronic, " right  -     XR Hip With or Without Pelvis 2 - 3 View Right; Future    6. Lumbar pain  -     XR Spine Lumbar 2 or 3 View; Future  -     baclofen (LIORESAL) 10 MG tablet; Take 1 tablet by mouth every night at bedtime.  Dispense: 30 tablet; Refill: 3    7. Insomnia, unspecified type  Comments:  much improved on current dose  refills today  Orders:  -     zolpidem (AMBIEN) 5 MG tablet; Take 1.5 tablets by mouth At Night As Needed for Sleep.  Dispense: 45 tablet; Refill: 3    8. Encounter for smoking cessation counseling  -     varenicline (CHANTIX BRANDON) 0.5 MG X 11 & 1 MG X 42 tablet; Take 0.5 mg po daily x 3 days, then 0.5 mg po bid x 4 days, then 1 mg po bid  Dispense: 53 tablet; Refill: 0  -     varenicline (CHANTIX) 1 MG tablet; Take 1 tablet by mouth 2 (Two) Times a Day for 140 days.  Dispense: 56 tablet; Refill: 4    9. Immunization due  -     Shingrix Vaccine    10. Gastroesophageal reflux disease without esophagitis      Return in about 3 months (around 5/24/2022) for Annual physical.

## 2022-02-25 LAB
ALBUMIN SERPL-MCNC: 4.8 G/DL (ref 3.8–4.8)
ALBUMIN/GLOB SERPL: 2.1 {RATIO} (ref 1.2–2.2)
ALP SERPL-CCNC: 59 IU/L (ref 44–121)
ALT SERPL-CCNC: 26 IU/L (ref 0–32)
AST SERPL-CCNC: 23 IU/L (ref 0–40)
BASOPHILS # BLD AUTO: 0 X10E3/UL (ref 0–0.2)
BASOPHILS NFR BLD AUTO: 1 %
BILIRUB SERPL-MCNC: 0.4 MG/DL (ref 0–1.2)
BUN SERPL-MCNC: 17 MG/DL (ref 8–27)
BUN/CREAT SERPL: 24 (ref 12–28)
CALCIUM SERPL-MCNC: 10.3 MG/DL (ref 8.7–10.3)
CHLORIDE SERPL-SCNC: 100 MMOL/L (ref 96–106)
CHOLEST SERPL-MCNC: 230 MG/DL (ref 100–199)
CO2 SERPL-SCNC: 24 MMOL/L (ref 20–29)
CREAT SERPL-MCNC: 0.71 MG/DL (ref 0.57–1)
EOSINOPHIL # BLD AUTO: 0.1 X10E3/UL (ref 0–0.4)
EOSINOPHIL NFR BLD AUTO: 2 %
ERYTHROCYTE [DISTWIDTH] IN BLOOD BY AUTOMATED COUNT: 11.9 % (ref 11.7–15.4)
GLOBULIN SER CALC-MCNC: 2.3 G/DL (ref 1.5–4.5)
GLUCOSE SERPL-MCNC: 101 MG/DL (ref 65–99)
HCT VFR BLD AUTO: 38 % (ref 34–46.6)
HDLC SERPL-MCNC: 78 MG/DL
HGB BLD-MCNC: 12.6 G/DL (ref 11.1–15.9)
IMM GRANULOCYTES # BLD AUTO: 0 X10E3/UL (ref 0–0.1)
IMM GRANULOCYTES NFR BLD AUTO: 0 %
LDLC SERPL CALC-MCNC: 140 MG/DL (ref 0–99)
LYMPHOCYTES # BLD AUTO: 0.7 X10E3/UL (ref 0.7–3.1)
LYMPHOCYTES NFR BLD AUTO: 18 %
MCH RBC QN AUTO: 29.9 PG (ref 26.6–33)
MCHC RBC AUTO-ENTMCNC: 33.2 G/DL (ref 31.5–35.7)
MCV RBC AUTO: 90 FL (ref 79–97)
MONOCYTES # BLD AUTO: 0.5 X10E3/UL (ref 0.1–0.9)
MONOCYTES NFR BLD AUTO: 13 %
NEUTROPHILS # BLD AUTO: 2.7 X10E3/UL (ref 1.4–7)
NEUTROPHILS NFR BLD AUTO: 66 %
PLATELET # BLD AUTO: 386 X10E3/UL (ref 150–450)
POTASSIUM SERPL-SCNC: 4.4 MMOL/L (ref 3.5–5.2)
PROT SERPL-MCNC: 7.1 G/DL (ref 6–8.5)
RBC # BLD AUTO: 4.21 X10E6/UL (ref 3.77–5.28)
SODIUM SERPL-SCNC: 139 MMOL/L (ref 134–144)
TRIGL SERPL-MCNC: 69 MG/DL (ref 0–149)
VLDLC SERPL CALC-MCNC: 12 MG/DL (ref 5–40)
WBC # BLD AUTO: 4.1 X10E3/UL (ref 3.4–10.8)

## 2022-02-26 ENCOUNTER — HOSPITAL ENCOUNTER (OUTPATIENT)
Dept: GENERAL RADIOLOGY | Facility: HOSPITAL | Age: 67
Discharge: HOME | End: 2022-02-26

## 2022-02-26 DIAGNOSIS — M54.50 LUMBAR PAIN: ICD-10-CM

## 2022-02-26 DIAGNOSIS — M25.551 HIP PAIN, CHRONIC, RIGHT: ICD-10-CM
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
## 2022-02-26 DIAGNOSIS — G89.29 HIP PAIN, CHRONIC, RIGHT: ICD-10-CM

## 2022-02-26 PROCEDURE — 72100 X-RAY EXAM L-S SPINE 2/3 VWS: CPT

## 2022-02-26 PROCEDURE — 73502 X-RAY EXAM HIP UNI 2-3 VIEWS: CPT

## 2022-03-02 ENCOUNTER — TELEPHONE (OUTPATIENT)
Dept: FAMILY MEDICINE CLINIC | Facility: CLINIC | Age: 67
End: 2022-03-02

## 2022-03-02 NOTE — TELEPHONE ENCOUNTER
Spoke with patient and she stated that she is wanting to discuss the finding of the xray.  There was a cyst found.  I informed patient Dr. Kebede is out of the office until tomorrow and we will contact her at that time.  She stated that she already has a neruologist

## 2022-03-02 NOTE — TELEPHONE ENCOUNTER
Caller: Karen Ward    Relationship: Self    Best call back number: [phone]     Caller requesting test results: YES    What test was performed: XR SPINE LUMBAR / XR HIP    When was the test performed: 02/26/22    Additional notes: PATIENT VIEW THESE RESULTS ON [facility], BUT WOULD LIKE TO DISCUSS THESE WITH DR. SANDOVAL TO FIGURE OUT HOW SHE WOULD LIKE TO PROCEED.

## 2022-03-03 NOTE — TELEPHONE ENCOUNTER
I called patient and explained to her that she has a cyst in the femoral head on the left.  And I suggest that she is going to see an orthopedic.  She wants to wait until her new insurance kicks in and then will put a referral and

## 2022-03-06 DIAGNOSIS — Z71.6 ENCOUNTER FOR SMOKING CESSATION COUNSELING: ICD-10-CM

## 2022-03-06 RX ORDER — VARENICLINE TARTRATE 1 MG/1
1 TABLET, FILM COATED ORAL 2 TIMES DAILY
Qty: 56 TABLET | Refills: 4 | Status: CANCELLED | OUTPATIENT
Start: 2022-03-24 | End: 2022-08-11

## 2022-03-07 DIAGNOSIS — Z71.6 ENCOUNTER FOR SMOKING CESSATION COUNSELING: ICD-10-CM

## 2022-03-07 RX ORDER — VARENICLINE TARTRATE 1 MG/1
1 TABLET, FILM COATED ORAL 2 TIMES DAILY
Qty: 56 TABLET | Refills: 4 | OUTPATIENT
Start: 2022-03-24 | End: 2022-08-11

## 2022-04-26 ENCOUNTER — APPOINTMENT (OUTPATIENT)
Dept: BONE DENSITY | Facility: HOSPITAL | Age: 67
End: 2022-04-26

## 2022-05-04 ENCOUNTER — TELEPHONE (OUTPATIENT)
Dept: FAMILY MEDICINE CLINIC | Facility: CLINIC | Age: 67
End: 2022-05-04

## 2022-05-04 DIAGNOSIS — G47.00 INSOMNIA, UNSPECIFIED TYPE: ICD-10-CM

## 2022-05-04 NOTE — TELEPHONE ENCOUNTER
THE PATIENT STATES THAT SHE RECENTLY FELL BACKWARDS AT WORK AND HURT HER BACK. SHE WAS SEEN BY A PROVIDER AT [facility] WHO TOLD HER THAT SHE WILL NEED TO BE PUT ON SOME ARTHRITIS/PAIN MEDICATION FOR HER BACK. THE PATIENT STATES THAT SHE IS ONLY TAKING A LOT OF ADVIL RIGHT NOW. THE PATIENT WOULD LIKE TO DISCUSS THIS WITH CLINICAL STAFF. PLEASE ADVISE BY CALLING [phone].

## 2022-05-06 DIAGNOSIS — M54.50 LUMBAR PAIN: ICD-10-CM

## 2022-05-06 DIAGNOSIS — G47.00 INSOMNIA, UNSPECIFIED TYPE: ICD-10-CM

## 2022-05-06 RX ORDER — BACLOFEN 10 MG/1
10 TABLET ORAL
Qty: 30 TABLET | Refills: 3 | Status: SHIPPED | OUTPATIENT
Start: 2022-05-06 | End: 2022-05-19 | Stop reason: SDUPTHER

## 2022-05-06 RX ORDER — ZOLPIDEM TARTRATE 5 MG/1
7.5 TABLET ORAL NIGHTLY PRN
Qty: 45 TABLET | Refills: 3 | Status: CANCELLED | OUTPATIENT
Start: 2022-05-06

## 2022-05-06 RX ORDER — ZOLPIDEM TARTRATE 5 MG/1
7.5 TABLET ORAL NIGHTLY PRN
Qty: 45 TABLET | Refills: 3 | Status: SHIPPED | OUTPATIENT
Start: 2022-05-06 | End: 2022-09-21 | Stop reason: SDUPTHER

## 2022-05-06 NOTE — TELEPHONE ENCOUNTER
LVMTCB    Ok for hub and  can read message    Patient had the Shingrix vaccine so she needs the 2nd dose of Shingrix

## 2022-05-06 NOTE — TELEPHONE ENCOUNTER
DELETE AFTER REVIEWING: Send the encounter HIGH priority, If patient has less than a 3 day supply. If the patient will run out of medication over the weekend add that information to the additional details line. Send this encounter to the clinical pool.    Caller: Karen Ward    Relationship: Self    Best call back number: [phone]    Requested Prescriptions:   Requested Prescriptions     Pending Prescriptions Disp Refills   • zolpidem (AMBIEN) 5 MG tablet 45 tablet 3     Sig: Take 1.5 tablets by mouth At Night As Needed for Sleep.        Pharmacy where request should be sent: [facility] [place] [address] AT [facility] [address]  - [phone]  - [phone] FX     Additional details provided by patient: PATIENT ADVISED THAT SHE HAS BEEN TAKING A BACLOFEN DURING THE DAY TOO.  SHE SAID YOU MAY NEED TO PRESCRIBE SOME MORE    Does the patient have less than a 3 day supply:  [x] Yes  [] No    Estefanía Cason Rep   05/06/22 11:38 EDT         HUB READ:  Ok for hub and  can read message     Patient had the Shingrix vaccine so she needs the 2nd dose of Shingrix

## 2022-05-06 NOTE — TELEPHONE ENCOUNTER
Caller: Karen Ward    Relationship to patient: Self    Best call back number: [phone]    Patient is needing: PATIENT NEEDS TO GET HER 2ND SHINGLES SHOT BUT CAN'T AFFORD TO COME IN AND GET ONE.  SHE IS GOING TO GO TO [place].  SHE NEEDS TO KNOW WHICH ONE SHE HAD BEFORE?

## 2022-05-11 ENCOUNTER — TELEPHONE (OUTPATIENT)
Dept: FAMILY MEDICINE CLINIC | Facility: CLINIC | Age: 67
End: 2022-05-11

## 2022-05-11 NOTE — TELEPHONE ENCOUNTER
Caller: Karen Ward    Relationship: Self    Best call back number: [phone]    What medication are you requesting: SOMETHING TO HELP HER TO QUIT SMOKING/CHANTIX IS STILL BACKORDERED    What are your current symptoms: SMOKING      Have you had these symptoms before:    [x] Yes  [] No    Have you been treated for these symptoms before:   [] Yes  [x] No    If a prescription is needed, what is your preferred pharmacy and phone number: [facility] [place] [address] AT [facility] [address]  - [phone] [facility] [phone] FX     Additional notes:PLEASE ADVISE.

## 2022-05-12 NOTE — TELEPHONE ENCOUNTER
MAY READ INFORMATION TO THE PATIENT AND SHE STATED THAT SHE UNDERSTOOD THAT THIS WILL BE DISCUSSED AT HER APPOINTMENT ON 5/24.

## 2022-05-18 DIAGNOSIS — M54.50 LUMBAR PAIN: ICD-10-CM

## 2022-05-18 RX ORDER — BACLOFEN 10 MG/1
10 TABLET ORAL
Qty: 30 TABLET | Refills: 3 | OUTPATIENT
Start: 2022-05-18

## 2022-05-18 NOTE — TELEPHONE ENCOUNTER
Caller: Karen Ward    Relationship: Self    Best call back number: REMINDERS BY TEXT WELLAPP  CONFIRMED. [facility] 05/18/2022    Requested Prescriptions:   Requested Prescriptions     Pending Prescriptions Disp Refills   • baclofen (LIORESAL) 10 MG tablet 30 tablet 3     Sig: Take 1 tablet by mouth every night at bedtime.        Pharmacy where request should be sent: [facility] [place] [address] AT [facility] [address] [facility] [phone] [facility] [phone] FX     Additional details provided by patient: PATIENT HAS ABOUT 4 LEFT AND SHE STATES THAT SHE IS IN A LOT OF PAIN AND THAT SHE IS HAVING TO TAKE TWO OF THESE A DAY AND IS ASKING TO GET A NEW SCRIPT SENT IN WITH THAT INCREASED DOSAGE. SHE HAS ALSO MADE APPT WITH DOCTOR FOR PHYSICAL ON 05/31/22.      Does the patient have less than a 3 day supply:  [x] Yes  [] No    Ana Laura Mcgee, PCT   05/18/22 11:55 EDT

## 2022-05-18 NOTE — TELEPHONE ENCOUNTER
Rx Refill Note  Requested Prescriptions     Refused Prescriptions Disp Refills   • baclofen (LIORESAL) 10 MG tablet 30 tablet 3     Sig: Take 1 tablet by mouth every night at bedtime.     Refused By: SURI FERNANDEZ     Reason for Refusal: Duplicate renewal request      Last office visit with prescribing clinician: 2/24/2022      Next office visit with prescribing clinician: 5/18/2022     Suri Fernandez MA  05/18/22, 16:50 EDT

## 2022-05-19 DIAGNOSIS — M54.50 LUMBAR PAIN: ICD-10-CM

## 2022-05-19 RX ORDER — BACLOFEN 10 MG/1
10 TABLET ORAL 3 TIMES DAILY
Qty: 90 TABLET | Refills: 3 | Status: SHIPPED | OUTPATIENT
Start: 2022-05-19 | End: 2022-09-21 | Stop reason: SDUPTHER

## 2022-05-31 ENCOUNTER — OFFICE VISIT (OUTPATIENT)
Dept: FAMILY MEDICINE CLINIC | Facility: CLINIC | Age: 67
End: 2022-05-31

## 2022-05-31 VITALS
HEIGHT: 65 IN | BODY MASS INDEX: 24.49 KG/M2 | SYSTOLIC BLOOD PRESSURE: 155 MMHG | DIASTOLIC BLOOD PRESSURE: 73 MMHG | TEMPERATURE: 98.6 F | HEART RATE: 101 BPM | WEIGHT: 147 LBS | OXYGEN SATURATION: 97 %

## 2022-05-31 DIAGNOSIS — F32.A DEPRESSION, UNSPECIFIED DEPRESSION TYPE: ICD-10-CM

## 2022-05-31 DIAGNOSIS — R21 RASH: ICD-10-CM

## 2022-05-31 DIAGNOSIS — Z00.00 HEALTH MAINTENANCE EXAMINATION: Primary | ICD-10-CM

## 2022-05-31 DIAGNOSIS — Z23 IMMUNIZATION DUE: ICD-10-CM

## 2022-05-31 DIAGNOSIS — M54.50 LUMBAR PAIN: ICD-10-CM

## 2022-05-31 DIAGNOSIS — I10 ESSENTIAL HYPERTENSION: ICD-10-CM

## 2022-05-31 DIAGNOSIS — F17.210 CIGARETTE SMOKER: ICD-10-CM

## 2022-05-31 DIAGNOSIS — E78.5 HYPERLIPIDEMIA, UNSPECIFIED HYPERLIPIDEMIA TYPE: ICD-10-CM

## 2022-05-31 DIAGNOSIS — M54.16 LUMBAR RADICULOPATHY: ICD-10-CM

## 2022-05-31 DIAGNOSIS — R60.9 EDEMA, UNSPECIFIED TYPE: ICD-10-CM

## 2022-05-31 DIAGNOSIS — M51.36 BULGE OF LUMBAR DISC WITHOUT MYELOPATHY: ICD-10-CM

## 2022-05-31 DIAGNOSIS — Z71.6 ENCOUNTER FOR SMOKING CESSATION COUNSELING: ICD-10-CM

## 2022-05-31 DIAGNOSIS — K21.9 GASTROESOPHAGEAL REFLUX DISEASE WITHOUT ESOPHAGITIS: ICD-10-CM

## 2022-05-31 DIAGNOSIS — Z12.31 VISIT FOR SCREENING MAMMOGRAM: ICD-10-CM

## 2022-05-31 PROCEDURE — 99396 PREV VISIT EST AGE 40-64: CPT | Performed by: FAMILY MEDICINE

## 2022-05-31 RX ORDER — VARENICLINE TARTRATE 1 MG/1
1 TABLET, FILM COATED ORAL 2 TIMES DAILY
Qty: 56 TABLET | Refills: 4 | Status: SHIPPED | OUTPATIENT
Start: 2022-05-31 | End: 2022-09-21 | Stop reason: SDUPTHER

## 2022-05-31 RX ORDER — BUMETANIDE 2 MG/1
2 TABLET ORAL DAILY
Qty: 30 TABLET | Refills: 11 | Status: SHIPPED | OUTPATIENT
Start: 2022-05-31 | End: 2022-09-21 | Stop reason: SDUPTHER

## 2022-05-31 RX ORDER — ESCITALOPRAM OXALATE 20 MG/1
20 TABLET ORAL DAILY
Qty: 30 TABLET | Refills: 11 | Status: SHIPPED | OUTPATIENT
Start: 2022-05-31 | End: 2022-09-21 | Stop reason: SDUPTHER

## 2022-05-31 RX ORDER — OMEPRAZOLE 40 MG/1
40 CAPSULE, DELAYED RELEASE ORAL DAILY
Qty: 30 CAPSULE | Refills: 10 | Status: SHIPPED | OUTPATIENT
Start: 2022-05-31 | End: 2022-09-21 | Stop reason: SDUPTHER

## 2022-05-31 RX ORDER — POTASSIUM CHLORIDE 20 MEQ/1
20 TABLET, EXTENDED RELEASE ORAL DAILY
Qty: 30 TABLET | Refills: 10 | Status: SHIPPED | OUTPATIENT
Start: 2022-05-31 | End: 2022-09-21 | Stop reason: SDUPTHER

## 2022-05-31 RX ORDER — AMMONIUM LACTATE 120 MG/G
CREAM TOPICAL AS NEEDED
Qty: 500 G | Refills: 11 | Status: SHIPPED | OUTPATIENT
Start: 2022-05-31 | End: 2022-09-21 | Stop reason: SDUPTHER

## 2022-05-31 RX ORDER — ATORVASTATIN CALCIUM 40 MG/1
40 TABLET, FILM COATED ORAL DAILY
Qty: 90 TABLET | Refills: 3 | Status: SHIPPED | OUTPATIENT
Start: 2022-05-31 | End: 2022-09-21 | Stop reason: SDUPTHER

## 2022-05-31 NOTE — PROGRESS NOTES
"Karen Arora is here today for an annual physical exam.     Eating a healthy diet. Exercising routinely. yes  Regular periods. Wears seat belt. Feels safe at home.   Sexual activity:yes  Birth control:n/a  Pregnancy:  Sexual and gender orientation:h/s    PHQ-2 Depression Screening  Little interest or pleasure in doing things? 0-->not at all   Feeling down, depressed, or hopeless? 0-->not at all   PHQ-2 Total Score 0         I have reviewed the patient's medical, family, and social history in detail and updated the computerized patient record.    Screening history:  Colonoscopy - 7/18/14  Mammogram- 5/2021, Pap/pelvic - n/a s/p hysterectomy  Metabolic - NL  Dental and vision are due    Health Maintenance   Topic Date Due   • COVID-19 Vaccine (4 - Booster for Pfizer series) 03/27/2022   • INFLUENZA VACCINE  08/01/2022   • LIPID PANEL  02/24/2023   • MAMMOGRAM  05/24/2023   • ANNUAL PHYSICAL  06/01/2023   • COLORECTAL CANCER SCREENING  07/18/2024   • TDAP/TD VACCINES (2 - Td or Tdap) 05/28/2032   • HEPATITIS C SCREENING  Completed   • Pneumococcal Vaccine 0-64  Aged Out   • PAP SMEAR  Discontinued   • ZOSTER VACCINE  Discontinued       Review of Systems    /73 (BP Location: Right arm, Patient Position: Sitting)   Pulse 101   Temp 98.6 °F (37 °C)   Ht 165 cm (64.96\")   Wt 66.7 kg (147 lb)   SpO2 97%   BMI 24.49 kg/m²      Physical Exam      Physical Exam  No visits with results within 2 Week(s) from this visit.   Latest known visit with results is:   Office Visit on 02/24/2022   Component Date Value Ref Range Status   • Glucose 02/24/2022 101 (A) 65 - 99 mg/dL Final   • BUN 02/24/2022 17  8 - 27 mg/dL Final   • Creatinine 02/24/2022 0.71  0.57 - 1.00 mg/dL Final    Comment:                **Effective February 28, 2022 [facility] will begin**                   reporting the 2021 CKD-EPI creatinine equation that                   estimates kidney function without a race variable.     • eGFR Non  " Am 02/24/2022 90  >59 mL/min/1.73 Final   • eGFR African Am 02/24/2022 104  >59 mL/min/1.73 Final    Comment: **In accordance with recommendations from the NKF-ASN Task force,**    [facility] is in the [facility] updating its eGFR calculation to the    2021 CKD-EPI creatinine equation that estimates kidney function    without a race variable.     • BUN/Creatinine Ratio 02/24/2022 24  12 - 28 Final   • Sodium 02/24/2022 139  134 - 144 mmol/L Final   • Potassium 02/24/2022 4.4  3.5 - 5.2 mmol/L Final   • Chloride 02/24/2022 100  96 - 106 mmol/L Final   • Total CO2 02/24/2022 24  20 - 29 mmol/L Final   • Calcium 02/24/2022 10.3  8.7 - 10.3 mg/dL Final   • Total Protein 02/24/2022 7.1  6.0 - 8.5 g/dL Final   • Albumin 02/24/2022 4.8  3.8 - 4.8 g/dL Final   • Globulin 02/24/2022 2.3  1.5 - 4.5 g/dL Final   • A/G Ratio 02/24/2022 2.1  1.2 - 2.2 Final   • Total Bilirubin 02/24/2022 0.4  0.0 - 1.2 mg/dL Final   • Alkaline Phosphatase 02/24/2022 59  44 - 121 IU/L Final   • AST (SGOT) 02/24/2022 23  0 - 40 IU/L Final   • ALT (SGPT) 02/24/2022 26  0 - 32 IU/L Final   • Total Cholesterol 02/24/2022 230 (A) 100 - 199 mg/dL Final   • Triglycerides 02/24/2022 69  0 - 149 mg/dL Final   • HDL Cholesterol 02/24/2022 78  >39 mg/dL Final   • VLDL Cholesterol Fidel 02/24/2022 12  5 - 40 mg/dL Final   • LDL Chol Calc (NIH) 02/24/2022 140 (A) 0 - 99 mg/dL Final   • WBC 02/24/2022 4.1  3.4 - 10.8 x10E3/uL Final   • RBC 02/24/2022 4.21  3.77 - 5.28 x10E6/uL Final   • Hemoglobin 02/24/2022 12.6  11.1 - 15.9 g/dL Final   • Hematocrit 02/24/2022 38.0  34.0 - 46.6 % Final   • MCV 02/24/2022 90  79 - 97 fL Final   • MCH 02/24/2022 29.9  26.6 - 33.0 pg Final   • MCHC 02/24/2022 33.2  31.5 - 35.7 g/dL Final   • RDW 02/24/2022 11.9  11.7 - 15.4 % Final   • Platelets 02/24/2022 386  150 - 450 x10E3/uL Final   • Neutrophil Rel % 02/24/2022 66  Not Estab. % Final   • Lymphocyte Rel % 02/24/2022 18  Not Estab. % Final   • Monocyte Rel % 02/24/2022 13  Not  Estab. % Final   • Eosinophil Rel % 02/24/2022 2  Not Estab. % Final   • Basophil Rel % 02/24/2022 1  Not Estab. % Final   • Neutrophils Absolute 02/24/2022 2.7  1.4 - 7.0 x10E3/uL Final   • Lymphocytes Absolute 02/24/2022 0.7  0.7 - 3.1 x10E3/uL Final   • Monocytes Absolute 02/24/2022 0.5  0.1 - 0.9 x10E3/uL Final   • Eosinophils Absolute 02/24/2022 0.1  0.0 - 0.4 x10E3/uL Final   • Basophils Absolute 02/24/2022 0.0  0.0 - 0.2 x10E3/uL Final   • Immature Granulocyte Rel % 02/24/2022 0  Not Estab. % Final   • Immature Grans Absolute 02/24/2022 0.0  0.0 - 0.1 x10E3/uL Final         Current Outpatient Medications:   •  ammonium lactate (AMLACTIN) 12 % cream, Apply  topically to the appropriate area as directed As Needed for Dry Skin., Disp: 500 g, Rfl: 11  •  atorvastatin (LIPITOR) 40 MG tablet, Take 1 tablet by mouth Daily., Disp: 90 tablet, Rfl: 3  •  baclofen (LIORESAL) 10 MG tablet, Take 1 tablet by mouth 3 (Three) Times a Day., Disp: 90 tablet, Rfl: 3  •  bumetanide (BUMEX) 2 MG tablet, Take 1 tablet by mouth Daily., Disp: 30 tablet, Rfl: 11  •  cetirizine (zyrTEC) 10 MG tablet, Take 1 tablet by mouth Daily., Disp: 30 tablet, Rfl: 11  •  escitalopram (LEXAPRO) 20 MG tablet, Take 1 tablet by mouth Daily., Disp: 30 tablet, Rfl: 11  •  losartan (COZAAR) 50 MG tablet, Take 2 tablets by mouth Daily., Disp: 60 tablet, Rfl: 10  •  Omega-3 Fatty Acids (FISH OIL) 1200 MG capsule capsule, Take 2 capsules by mouth 2 (Two) Times a Day With Meals., Disp: 120 capsule, Rfl: 11  •  omeprazole (priLOSEC) 40 MG capsule, Take 1 capsule by mouth Daily., Disp: 30 capsule, Rfl: 10  •  potassium chloride (KLOR-CON) 20 MEQ CR tablet, Take 1 tablet by mouth Daily., Disp: 30 tablet, Rfl: 10  •  varenicline (CHANTIX) 1 MG tablet, Take 1 tablet by mouth 2 (Two) Times a Day for 140 days., Disp: 56 tablet, Rfl: 4  •  zolpidem (AMBIEN) 5 MG tablet, Take 1.5 tablets by mouth At Night As Needed for Sleep., Disp: 45 tablet, Rfl: 3    Diagnoses  and all orders for this visit:    1. Health maintenance examination (Primary)    2. Visit for screening mammogram  -     Mammo Screening Digital Tomosynthesis Bilateral With CAD; Future    3. Lumbar pain  -     Ambulatory Referral to Pain Management  -     MRI Lumbar Spine Without Contrast; Future    4. Lumbar radiculopathy  -     Ambulatory Referral to Pain Management  -     MRI Lumbar Spine Without Contrast; Future    5. Bulge of lumbar disc without myelopathy  -     Ambulatory Referral to Pain Management  -     MRI Lumbar Spine Without Contrast; Future    6. Hyperlipidemia, unspecified hyperlipidemia type  -     atorvastatin (LIPITOR) 40 MG tablet; Take 1 tablet by mouth Daily.  Dispense: 90 tablet; Refill: 3    7. Immunization due  -     Cancel: COVID-19 Vaccine (Pfizer) Gray Cap    8. Rash  -     ammonium lactate (AMLACTIN) 12 % cream; Apply  topically to the appropriate area as directed As Needed for Dry Skin.  Dispense: 500 g; Refill: 11    9. Edema, unspecified type  -     bumetanide (BUMEX) 2 MG tablet; Take 1 tablet by mouth Daily.  Dispense: 30 tablet; Refill: 11    10. Depression, unspecified depression type  -     escitalopram (LEXAPRO) 20 MG tablet; Take 1 tablet by mouth Daily.  Dispense: 30 tablet; Refill: 11    11. Essential hypertension  -     potassium chloride (KLOR-CON) 20 MEQ CR tablet; Take 1 tablet by mouth Daily.  Dispense: 30 tablet; Refill: 10    12. Gastroesophageal reflux disease without esophagitis  -     omeprazole (priLOSEC) 40 MG capsule; Take 1 capsule by mouth Daily.  Dispense: 30 capsule; Refill: 10    13. Encounter for smoking cessation counseling  -     varenicline (CHANTIX) 1 MG tablet; Take 1 tablet by mouth 2 (Two) Times a Day for 140 days.  Dispense: 56 tablet; Refill: 4    14. Cigarette smoker  -      CT Chest Low Dose Cancer Screening WO; Future    preventive guidance given    Encourage healthy diet and exercise.  Encourage patient to stay up to date on screening  examinations as indicated based on age and risk factors. F/U yearly.     Return in about 3 months (around 8/31/2022) for HYPERLIPIDEMIA.

## 2022-06-16 ENCOUNTER — APPOINTMENT (OUTPATIENT)
Dept: BONE DENSITY | Facility: HOSPITAL | Age: 67
End: 2022-06-16

## 2022-07-07 ENCOUNTER — TELEPHONE (OUTPATIENT)
Dept: FAMILY MEDICINE CLINIC | Facility: CLINIC | Age: 67
End: 2022-07-07

## 2022-07-07 DIAGNOSIS — M51.36 BULGE OF LUMBAR DISC WITHOUT MYELOPATHY: Primary | ICD-10-CM

## 2022-07-07 NOTE — TELEPHONE ENCOUNTER
Spoke to patient she wants to know is she can get another referral to get her CT scan done it's expiring on 7/31/22 and she wouldn't be able to get it done before that date due to work.

## 2022-07-07 NOTE — TELEPHONE ENCOUNTER
Caller: Karen Ward    Relationship: Self    Best call back number: [phone]    What is the medical concern/diagnosis: CHRONIC BACK PAIN, HERNIATED DISC, ARTHRITIS     What specialty or service is being requested: NEUROSURGERY     What is the provider, practice or medical service name: DR. MICHELLE MCPHERSON    Any additional details: PATIENT STATES THAT SHE SPOKE TO THE PAIN MANAGAMENT ([facility]) AND THEY WANTED TO SCHEDULE HER FOR A CONSULTATION TO DISCUSS THE CAUSE OF HER PAIN BEFORE SHE CAN HAVE AN EPIDURAL. PATIENT FEELS LIKE THIS WOULD BE A WASTE OF HER TIME AND MONEY TO HAVE TO DO A CONSULTATION PRIOR TO RECEIVING AN EPIDURAL. PATIENT IS REQUESTING TO BE REFERRED TO DR. MICHELLE MCPHERSON AGAIN. DUE TO INSURANCE, A NEW REFERRAL IS NEEDED. PLEASE ADVISE.

## 2022-07-08 DIAGNOSIS — F17.200 SMOKING: Primary | ICD-10-CM

## 2022-07-08 NOTE — TELEPHONE ENCOUNTER
LVMTCB    Ok for hub and  can read message    Which CT?  She has CT chest and MRI of the lumbar spine scheduled.   Does she want both of those early scheduled?

## 2022-07-11 ENCOUNTER — TELEPHONE (OUTPATIENT)
Dept: FAMILY MEDICINE CLINIC | Facility: CLINIC | Age: 67
End: 2022-07-11

## 2022-07-11 NOTE — TELEPHONE ENCOUNTER
[facility] ref# [id number] they are needing more documentation for the MRI of Lumbar Spine    Fax # [phone]   Phone # [phone]    & if peer to peer is needed the # is [phone] ref# is the same as above

## 2022-07-15 ENCOUNTER — APPOINTMENT (OUTPATIENT)
Dept: MRI IMAGING | Facility: HOSPITAL | Age: 67
End: 2022-07-15

## 2022-07-15 ENCOUNTER — APPOINTMENT (OUTPATIENT)
Dept: CT IMAGING | Facility: HOSPITAL | Age: 67
End: 2022-07-15

## 2022-07-28 ENCOUNTER — TELEPHONE (OUTPATIENT)
Dept: FAMILY MEDICINE CLINIC | Facility: CLINIC | Age: 67
End: 2022-07-28

## 2022-07-28 DIAGNOSIS — J30.2 SEASONAL ALLERGIES: Primary | ICD-10-CM

## 2022-07-28 DIAGNOSIS — H65.493 CHRONIC OTITIS MEDIA OF BOTH EARS WITH EFFUSION: ICD-10-CM

## 2022-07-28 NOTE — TELEPHONE ENCOUNTER
Caller: Karen Ward    Relationship: Self    Best call back number: [phone]       What medication are you requesting: N\A    What are your current symptoms: FLUID IN EARS     How long have you been experiencing symptoms: 5/31/22    Have you had these symptoms before:    [] Yes  [x] No    Have you been treated for these symptoms before:   [] Yes  [x] No    If a prescription is needed, what is your preferred pharmacy and phone number: [facility] [place] [address] [address] [facility]  - [phone] [facility] [phone] FX     Additional notes:  PATIENT IS WANTING TO KNOW IF DR SANDOVAL COULD CALL IN A MEDICATION TO HELP WITH THIS. PATIENT ALSO STATES WHEN SHE SWALLOWS IT MAKES HER EARS HURT. PATIENT STATES SHE IS HAVING A LOT OF PAIN IN HER EARS.     PLEASE ADVISE

## 2022-07-28 NOTE — TELEPHONE ENCOUNTER
PATIENT IS REQUESTING AN UPDATE AS SOON AS POSSIBLE.     PLEASE CALL PATIENT BACK AS SOON POSSIBLE.     CALL BACK NUMBER: [phone]

## 2022-08-09 ENCOUNTER — TELEPHONE (OUTPATIENT)
Dept: FAMILY MEDICINE CLINIC | Facility: CLINIC | Age: 67
End: 2022-08-09

## 2022-08-09 NOTE — TELEPHONE ENCOUNTER
Caller: Karen Ward    Relationship: Self    Best call back number: [phone]    What specialty or service is being   requested: ORTHOPEDIC     What is the provider, practice or medical service name: ANY     Any additional details: PATIENT STATED SHE CANNOT BE REFERRED TO ORTHOPEDIC UNTIL SHE GETS HER TEST RESULTS FROM HER MRI AND  CT SCAN ON 8/26/22.

## 2022-08-11 ENCOUNTER — TELEPHONE (OUTPATIENT)
Dept: FAMILY MEDICINE CLINIC | Facility: CLINIC | Age: 67
End: 2022-08-11

## 2022-08-11 NOTE — TELEPHONE ENCOUNTER
PATIENT CALLED AND WANTED TO KNOW WHERE DR. SANDOVAL IS GOING SO SHE CAN FOLLOW HER.     PLEASE CALL [phone]

## 2022-08-18 ENCOUNTER — APPOINTMENT (OUTPATIENT)
Dept: MAMMOGRAPHY | Facility: HOSPITAL | Age: 67
End: 2022-08-18

## 2022-08-26 ENCOUNTER — APPOINTMENT (OUTPATIENT)
Dept: MRI IMAGING | Facility: HOSPITAL | Age: 67
End: 2022-08-26

## 2022-08-26 ENCOUNTER — HOSPITAL ENCOUNTER (OUTPATIENT)
Dept: CT IMAGING | Facility: HOSPITAL | Age: 67
Discharge: HOME | End: 2022-08-26
Admitting: FAMILY MEDICINE

## 2022-08-26 DIAGNOSIS — F17.210 CIGARETTE SMOKER: ICD-10-CM

## 2022-08-26 PROCEDURE — 71271 CT THORAX LUNG CANCER SCR C-: CPT

## 2022-09-19 ENCOUNTER — TELEPHONE (OUTPATIENT)
Dept: FAMILY MEDICINE CLINIC | Facility: CLINIC | Age: 67
End: 2022-09-19

## 2022-09-19 NOTE — TELEPHONE ENCOUNTER
Caller: Karen Ward    Relationship: Self    Best call back number: [phone]    What is the best time to reach you: ANY    Who are you requesting to speak with (clinical staff, provider,  specific staff member): CLINICAL    What was the call regarding: PATIENT STATES THAT SHE IS GOING TO BE SWITCHING HER PHARMACY TO [facility], AND NEEDS ALL OF HER MEDICATIONS FAXED TO [facility]. PLEASE ADVISE IF THIS IS POSSIBLE. [facility]    Do you require a callback: YES

## 2022-09-21 DIAGNOSIS — R21 RASH: ICD-10-CM

## 2022-09-21 DIAGNOSIS — M54.50 LUMBAR PAIN: ICD-10-CM

## 2022-09-21 DIAGNOSIS — G47.00 INSOMNIA, UNSPECIFIED TYPE: ICD-10-CM

## 2022-09-21 DIAGNOSIS — I10 ESSENTIAL HYPERTENSION: ICD-10-CM

## 2022-09-21 DIAGNOSIS — E78.5 HYPERLIPIDEMIA, UNSPECIFIED HYPERLIPIDEMIA TYPE: ICD-10-CM

## 2022-09-21 DIAGNOSIS — K21.9 GASTROESOPHAGEAL REFLUX DISEASE WITHOUT ESOPHAGITIS: ICD-10-CM

## 2022-09-21 DIAGNOSIS — J30.1 ALLERGIC RHINITIS DUE TO POLLEN, UNSPECIFIED SEASONALITY: ICD-10-CM

## 2022-09-21 DIAGNOSIS — F32.A DEPRESSION, UNSPECIFIED DEPRESSION TYPE: ICD-10-CM

## 2022-09-21 DIAGNOSIS — R60.9 EDEMA, UNSPECIFIED TYPE: ICD-10-CM

## 2022-09-21 DIAGNOSIS — Z71.6 ENCOUNTER FOR SMOKING CESSATION COUNSELING: ICD-10-CM

## 2022-09-21 NOTE — TELEPHONE ENCOUNTER
Caller: Devyn Arora Sarah J    Relationship: Self    Best call back number: [phone]     Requested Prescriptions:   ALL MEDICATION , 90 DAYS SUPPLY     Pharmacy where request should be sent: [facility] - [place], [place] - [address] [facility] [phone] [facility] [phone] FX     Additional details provided by patient:  SARAH CALLED TO REQUEST MEDICATION REFILL, SHE DOES NOT HAVE A NAME FOR ANY OF THE MEDICATION. THE ONLY ONE SHE SAYS IS NOT TO BE SENT TO [facility] IS [facility].     PATIENT TOLD THAT SHE IS NEEDING TO HAVE THE NAMES OF THE MEDICATION FOR REFILL REQUEST     1 WEEK OF MEDICATION REMAINING    Does the patient have less than a 3 day supply:  [] Yes  [x] No    Marcelle Sky   09/21/22 08:37 EDT

## 2022-09-22 RX ORDER — AMOXICILLIN 500 MG
2 CAPSULE ORAL 2 TIMES DAILY WITH MEALS
Qty: 120 CAPSULE | Refills: 11 | Status: SHIPPED | OUTPATIENT
Start: 2022-09-22

## 2022-09-22 RX ORDER — ESCITALOPRAM OXALATE 20 MG/1
20 TABLET ORAL DAILY
Qty: 30 TABLET | Refills: 11 | Status: SHIPPED | OUTPATIENT
Start: 2022-09-22

## 2022-09-22 RX ORDER — ZOLPIDEM TARTRATE 5 MG/1
7.5 TABLET ORAL NIGHTLY PRN
Qty: 45 TABLET | Refills: 3 | Status: SHIPPED | OUTPATIENT
Start: 2022-09-22 | End: 2022-11-14

## 2022-09-22 RX ORDER — LOSARTAN POTASSIUM 50 MG/1
100 TABLET ORAL DAILY
Qty: 60 TABLET | Refills: 10 | Status: SHIPPED | OUTPATIENT
Start: 2022-09-22 | End: 2022-09-26

## 2022-09-22 RX ORDER — CETIRIZINE HYDROCHLORIDE 10 MG/1
10 TABLET ORAL DAILY
Qty: 30 TABLET | Refills: 11 | Status: SHIPPED | OUTPATIENT
Start: 2022-09-22 | End: 2022-11-14

## 2022-09-22 RX ORDER — OMEPRAZOLE 40 MG/1
40 CAPSULE, DELAYED RELEASE ORAL DAILY
Qty: 30 CAPSULE | Refills: 10 | Status: SHIPPED | OUTPATIENT
Start: 2022-09-22

## 2022-09-22 RX ORDER — VARENICLINE TARTRATE 1 MG/1
1 TABLET, FILM COATED ORAL 2 TIMES DAILY
Qty: 56 TABLET | Refills: 4 | Status: SHIPPED | OUTPATIENT
Start: 2022-09-22 | End: 2023-02-09

## 2022-09-22 RX ORDER — POTASSIUM CHLORIDE 20 MEQ/1
20 TABLET, EXTENDED RELEASE ORAL DAILY
Qty: 30 TABLET | Refills: 10 | Status: SHIPPED | OUTPATIENT
Start: 2022-09-22

## 2022-09-22 RX ORDER — BUMETANIDE 2 MG/1
2 TABLET ORAL DAILY
Qty: 30 TABLET | Refills: 11 | Status: SHIPPED | OUTPATIENT
Start: 2022-09-22

## 2022-09-22 RX ORDER — AMMONIUM LACTATE 120 MG/G
CREAM TOPICAL AS NEEDED
Qty: 500 G | Refills: 11 | Status: SHIPPED | OUTPATIENT
Start: 2022-09-22

## 2022-09-22 RX ORDER — ATORVASTATIN CALCIUM 40 MG/1
40 TABLET, FILM COATED ORAL DAILY
Qty: 90 TABLET | Refills: 3 | Status: SHIPPED | OUTPATIENT
Start: 2022-09-22

## 2022-09-22 RX ORDER — BACLOFEN 10 MG/1
10 TABLET ORAL 3 TIMES DAILY
Qty: 90 TABLET | Refills: 3 | Status: SHIPPED | OUTPATIENT
Start: 2022-09-22

## 2022-09-25 ENCOUNTER — HOSPITAL ENCOUNTER (OUTPATIENT)
Dept: MRI IMAGING | Facility: HOSPITAL | Age: 67
Discharge: HOME | End: 2022-09-25
Admitting: FAMILY MEDICINE

## 2022-09-25 DIAGNOSIS — M51.36 BULGE OF LUMBAR DISC WITHOUT MYELOPATHY: ICD-10-CM

## 2022-09-25 DIAGNOSIS — M54.16 LUMBAR RADICULOPATHY: ICD-10-CM

## 2022-09-25 DIAGNOSIS — M54.50 LUMBAR PAIN: ICD-10-CM

## 2022-09-25 PROCEDURE — 72148 MRI LUMBAR SPINE W/O DYE: CPT

## 2022-09-26 DIAGNOSIS — I10 ESSENTIAL HYPERTENSION: ICD-10-CM

## 2022-09-26 RX ORDER — LOSARTAN POTASSIUM 100 MG/1
100 TABLET ORAL DAILY
Qty: 90 TABLET | Refills: 3 | Status: SHIPPED | OUTPATIENT
Start: 2022-09-26

## 2022-09-29 DIAGNOSIS — M48.54XA COMPRESSION FRACTURE OF THORACIC SPINE, NON-TRAUMATIC, INITIAL ENCOUNTER: Primary | ICD-10-CM

## 2022-10-04 ENCOUNTER — OFFICE VISIT (OUTPATIENT)
Dept: FAMILY MEDICINE CLINIC | Facility: CLINIC | Age: 67
End: 2022-10-04

## 2022-10-04 VITALS
BODY MASS INDEX: 23.49 KG/M2 | HEIGHT: 65 IN | OXYGEN SATURATION: 99 % | DIASTOLIC BLOOD PRESSURE: 85 MMHG | HEART RATE: 87 BPM | WEIGHT: 141 LBS | TEMPERATURE: 97.1 F | SYSTOLIC BLOOD PRESSURE: 139 MMHG

## 2022-10-04 DIAGNOSIS — M19.90 ARTHRITIS: ICD-10-CM

## 2022-10-04 DIAGNOSIS — Z23 IMMUNIZATION DUE: ICD-10-CM

## 2022-10-04 DIAGNOSIS — E78.2 MIXED HYPERLIPIDEMIA: ICD-10-CM

## 2022-10-04 DIAGNOSIS — Z12.31 VISIT FOR SCREENING MAMMOGRAM: ICD-10-CM

## 2022-10-04 DIAGNOSIS — I10 ESSENTIAL HYPERTENSION: ICD-10-CM

## 2022-10-04 DIAGNOSIS — Z78.0 POSTMENOPAUSAL: ICD-10-CM

## 2022-10-04 DIAGNOSIS — Z00.00 MEDICARE WELCOME VISIT: Primary | ICD-10-CM

## 2022-10-04 PROCEDURE — 90662 IIV NO PRSV INCREASED AG IM: CPT | Performed by: FAMILY MEDICINE

## 2022-10-04 PROCEDURE — G0008 ADMIN INFLUENZA VIRUS VAC: HCPCS | Performed by: FAMILY MEDICINE

## 2022-10-04 PROCEDURE — 91312 COVID-19 (PFIZER) BIVALENT BOOSTER 12+YRS: CPT | Performed by: FAMILY MEDICINE

## 2022-10-04 PROCEDURE — G0402 INITIAL PREVENTIVE EXAM: HCPCS | Performed by: FAMILY MEDICINE

## 2022-10-04 PROCEDURE — 1159F MED LIST DOCD IN RCRD: CPT | Performed by: FAMILY MEDICINE

## 2022-10-04 RX ORDER — MELOXICAM 15 MG/1
15 TABLET ORAL DAILY
Qty: 90 TABLET | Refills: 3 | Status: SHIPPED | OUTPATIENT
Start: 2022-10-04

## 2022-10-04 NOTE — PROGRESS NOTES
The ABCs of the Annual Wellness Visit  Welcome to Medicare Visit    Chief Complaint   Patient presents with   • Medicare Wellness-subsequent     Subjective {   History of Present Illness:  Karen Arora is a 67 y.o. female who presents for a  Welcome to Medicare Visit.    The following portions of the patient's history were reviewed and   updated as appropriate: allergies, current medications, past family history, past medical history, past social history, past surgical history and problem list.     Compared to one year ago, the patient feels her physical   health is worse.    Compared to one year ago, the patient feels her mental   health is the same.    Recent Hospitalizations:  She was not admitted to the hospital during the last year.       Current Medical Providers:  Patient Care Team:  Livia Kebede MD as PCP - General (Family Medicine)    Outpatient Medications Prior to Visit   Medication Sig Dispense Refill   • ammonium lactate (AMLACTIN) 12 % cream Apply  topically to the appropriate area as directed As Needed for Dry Skin. 500 g 11   • atorvastatin (LIPITOR) 40 MG tablet Take 1 tablet by mouth Daily. 90 tablet 3   • baclofen (LIORESAL) 10 MG tablet Take 1 tablet by mouth 3 (Three) Times a Day. 90 tablet 3   • bumetanide (BUMEX) 2 MG tablet Take 1 tablet by mouth Daily. 30 tablet 11   • cetirizine (zyrTEC) 10 MG tablet Take 1 tablet by mouth Daily. 30 tablet 11   • escitalopram (LEXAPRO) 20 MG tablet Take 1 tablet by mouth Daily. 30 tablet 11   • losartan (COZAAR) 100 MG tablet Take 1 tablet by mouth Daily. 90 tablet 3   • Omega-3 Fatty Acids (fish oil) 1200 MG capsule capsule Take 2 capsules by mouth 2 (Two) Times a Day With Meals. 120 capsule 11   • omeprazole (priLOSEC) 40 MG capsule Take 1 capsule by mouth Daily. 30 capsule 10   • potassium chloride (KLOR-CON) 20 MEQ CR tablet Take 1 tablet by mouth Daily. 30 tablet 10   • varenicline (CHANTIX) 1 MG tablet Take 1 tablet by mouth 2 (Two)  "Times a Day for 140 days. 56 tablet 4   • zolpidem (AMBIEN) 5 MG tablet Take 1.5 tablets by mouth At Night As Needed for Sleep. 45 tablet 3     No facility-administered medications prior to visit.       No opioid medication identified on active medication list. I have reviewed chart for other potential  high risk medication/s and harmful drug interactions in the elderly.          Aspirin is not on active medication list.  Aspirin use is not indicated based on review of current medical condition/s. Risk of harm outweighs potential benefits.  .    Patient Active Problem List   Diagnosis   • Visit for screening mammogram   • Blunt trauma to chest   • Bulge of lumbar disc without myelopathy   • Disorder of thyroid gland   • Cervical osteoarthritis   • Chest pain   • Contusion of rib   • Depressive disorder   • Esophageal reflux   • Fall   • Fatigue   • Flajani disease   • Gonalgia   • Hyperlipidemia   • Essential hypertension   • Hypokalemia   • Influenza vaccination declined   • Injury of head   • Insomnia   • Lesion of conjunctiva   • Lumbar radiculopathy   • Lump of breast   • Rib pain   • Seasonal allergies   • Sinusitis   • High risk medication use   • Immunization due   • Allergic rhinitis due to pollen   • Postmenopausal   • Lumbar pain   • Hip pain, chronic, right   • Arthritis     Advance Care Planning  Advance Directive is not on file.  ACP discussion was held with the patient during this visit. Patient has an advance directive (not in EMR), copy requested.    Review of Systems   Constitutional: Negative.         Objective      Vitals:    10/04/22 0933   BP: 139/85   BP Location: Right arm   Patient Position: Sitting   Pulse: 87   Temp: 97.1 °F (36.2 °C)   SpO2: 99%   Weight: 64 kg (141 lb)   Height: 165 cm (64.96\")     Estimated body mass index is 23.49 kg/m² as calculated from the following:    Height as of this encounter: 165 cm (64.96\").    Weight as of this encounter: 64 kg (141 lb).    BMI is within " normal parameters. No other follow-up for BMI required.      Does the patient have evidence of cognitive impairment? No    Physical Exam  Vitals and nursing note reviewed.   Constitutional:       Appearance: Normal appearance.   Neurological:      Mental Status: She is alert.              Procedures       HEALTH RISK ASSESSMENT    Smoking Status:  Social History     Tobacco Use   Smoking Status Light Tobacco Smoker   • Packs/day: 0.25   • Years: 0.50   • Pack years: 0.12   • Types: Cigarettes   • Last attempt to quit: 2020   • Years since quittin.8   Smokeless Tobacco Never Used     Alcohol Consumption:  Social History     Substance and Sexual Activity   Alcohol Use Yes       Fall Risk Screen:    STEADI Fall Risk Assessment was completed, and patient is at MODERATE risk for falls. Assessment completed on:10/4/2022    Depression Screen:   PHQ-2/PHQ-9 Depression Screening 10/4/2022   Retired PHQ-9 Total Score -   Retired Total Score -   Little Interest or Pleasure in Doing Things 0-->not at all   Feeling Down, Depressed or Hopeless -   PHQ-9: Brief Depression Severity Measure Score 0       Health Habits and Functional and Cognitive Screening:  Functional & Cognitive Status 10/4/2022   Do you have difficulty preparing food and eating? No   Do you have difficulty bathing yourself, getting dressed or grooming yourself? No   Do you have difficulty using the toilet? No   Do you have difficulty moving around from place to place? No   Do you have trouble with steps or getting out of a bed or a chair? No   Current Diet Well Balanced Diet   Dental Exam Up to date   Eye Exam Up to date   Exercise (times per week) 5 times per week   Current Exercises Include Walking   Do you need help using the phone?  No   Are you deaf or do you have serious difficulty hearing?  No   Do you need help with transportation? No   Do you need help shopping? No   Do you need help preparing meals?  No   Do you need help with housework?  No    Do you need help with laundry? No   Do you need help taking your medications? No   Do you need help managing money? No   Do you ever drive or ride in a car without wearing a seat belt? No   Have you felt unusual stress, anger or loneliness in the last month? Yes   Who do you live with? Alone   If you need help, do you have trouble finding someone available to you? No   Do you have difficulty concentrating, remembering or making decisions? No       Visual Acuity:     Visual Acuity Screening    Right eye Left eye Both eyes   Without correction: 20/40 20/100 20/50   With correction:          Age-appropriate Screening Schedule:  Refer to the list below for future screening recommendations based on patient's age, sex and/or medical conditions. Orders for these recommended tests are listed in the plan section. The patient has been provided with a written plan.    Health Maintenance   Topic Date Due   • DXA SCAN  Never done   • LIPID PANEL  02/24/2023   • MAMMOGRAM  05/24/2023   • TDAP/TD VACCINES (2 - Td or Tdap) 05/28/2032   • INFLUENZA VACCINE  Completed   • PAP SMEAR  Discontinued   • ZOSTER VACCINE  Discontinued          Assessment & Plan   CMS Preventative Services Quick Reference  Risk Factors Identified During Encounter  Fall Risk-High or Moderate  The above risks/problems have been discussed with the patient.  Pertinent information has been shared with the patient in the After Visit Summary.  Follow up plans and orders are seen below in the Assessment/Plan Section.    Diagnoses and all orders for this visit:    1. Medicare welcome visit (Primary)    2. Immunization due  -     Fluzone High-Dose 65+yrs ([id number])  -     COVID-19 Bivalent Booster (Pfizer) 12+yrs    3. Visit for screening mammogram  -     Mammo Screening Digital Tomosynthesis Bilateral With CAD; Future    4. Postmenopausal  -     DEXA Bone Density Axial; Future    5. Arthritis  -     meloxicam (MOBIC) 15 MG tablet; Take 1 tablet by mouth Daily.   Dispense: 90 tablet; Refill: 3    6. Essential hypertension  -     Comprehensive Metabolic Panel  -     Lipid Panel    7. Mixed hyperlipidemia  -     Comprehensive Metabolic Panel  -     Lipid Panel        Follow Up:      Return in about 1 year (around 10/4/2023) for Medicare Wellness.    An After Visit Summary and PPPS were made available to the patient.

## 2022-10-05 ENCOUNTER — APPOINTMENT (OUTPATIENT)
Dept: BONE DENSITY | Facility: HOSPITAL | Age: 67
End: 2022-10-05

## 2022-10-10 ENCOUNTER — TELEPHONE (OUTPATIENT)
Dept: NEUROSURGERY | Facility: CLINIC | Age: 67
End: 2022-10-10

## 2022-10-17 ENCOUNTER — TELEPHONE (OUTPATIENT)
Dept: NEUROSURGERY | Facility: CLINIC | Age: 67
End: 2022-10-17

## 2022-10-17 NOTE — TELEPHONE ENCOUNTER
Caller: Karen Ward    Relationship to patient: Self    Best call back number: [phone]    Patient is needing: PATIENT RETURNED CALLED TO SCHEDULE WITH CASANDRA, PLEASE CALL PATIENT TO SCHEDULE

## 2022-10-28 ENCOUNTER — TELEPHONE (OUTPATIENT)
Dept: FAMILY MEDICINE CLINIC | Facility: CLINIC | Age: 67
End: 2022-10-28

## 2022-10-28 NOTE — TELEPHONE ENCOUNTER
Caller: LINDA WITH  SCHEDULING    Relationship: SCHEDULING FOR [facility] call back number: [phone]    What orders are you requesting (i.e. lab or imaging): LUMBAR EPIDURAL    In what timeframe would the patient need to come in: ASAP    Where will you receive your lab/imaging services: CHANGE FROM [place] TO [facility]    Additional notes: PATIENT IS ASKING FOR THIS TO BE CHANGED ASAP, PLEASE SEND NEW ORDER AND LINDA WILL WATCH FOR NEW ORDER

## 2022-10-28 NOTE — TELEPHONE ENCOUNTER
PATIENT IS CALLING STATING THAT SHE WOULD LIKE TO GO TO [facility] INSTEAD OF [place].     PATIENT IS REQUESTING FOR THE REFERRAL FOR THE LUMBAR PAIN TO BE SWITCH TO PAIN MANAGEMENT AT [facility] AND MAKE SURE TO TAKE DR. VELAZQEUZ'S NAME OFF OF THE REFERRAL.     PLEASE CALL TO DISCUSS WITH PATIENT IF NEEDED.

## 2022-10-31 NOTE — TELEPHONE ENCOUNTER
The patient was calling to see if Dr. Kebede can cancel the referral for Pain Management at [place] and refer her to the Pain Management off of [facility]. Please advise [phone].

## 2022-11-12 DIAGNOSIS — G47.00 INSOMNIA, UNSPECIFIED TYPE: ICD-10-CM

## 2022-11-13 DIAGNOSIS — J30.1 ALLERGIC RHINITIS DUE TO POLLEN, UNSPECIFIED SEASONALITY: ICD-10-CM

## 2022-11-14 RX ORDER — ZOLPIDEM TARTRATE 5 MG/1
TABLET ORAL
Qty: 45 TABLET | Refills: 2 | Status: SHIPPED | OUTPATIENT
Start: 2022-11-14 | End: 2023-03-31 | Stop reason: SDUPTHER

## 2022-11-14 RX ORDER — CETIRIZINE HYDROCHLORIDE 10 MG/1
TABLET ORAL
Qty: 30 TABLET | Refills: 0 | Status: SHIPPED | OUTPATIENT
Start: 2022-11-14 | End: 2022-12-15

## 2022-11-14 NOTE — TELEPHONE ENCOUNTER
Patient called to check the status of her refill for certirizine.  She is completely out of medication.

## 2022-11-18 ENCOUNTER — ANESTHESIA EVENT (OUTPATIENT)
Dept: PAIN MEDICINE | Facility: HOSPITAL | Age: 67
End: 2022-11-18

## 2022-11-18 ENCOUNTER — ANESTHESIA (OUTPATIENT)
Dept: PAIN MEDICINE | Facility: HOSPITAL | Age: 67
End: 2022-11-18

## 2022-11-18 ENCOUNTER — HOSPITAL ENCOUNTER (OUTPATIENT)
Dept: PAIN MEDICINE | Facility: HOSPITAL | Age: 67
Discharge: HOME | End: 2022-11-18

## 2022-11-18 ENCOUNTER — HOSPITAL ENCOUNTER (OUTPATIENT)
Dept: GENERAL RADIOLOGY | Facility: HOSPITAL | Age: 67
Discharge: HOME | End: 2022-11-18

## 2022-11-18 VITALS
TEMPERATURE: 97.1 F | HEART RATE: 80 BPM | OXYGEN SATURATION: 96 % | SYSTOLIC BLOOD PRESSURE: 157 MMHG | RESPIRATION RATE: 16 BRPM | HEIGHT: 66 IN | WEIGHT: 139 LBS | DIASTOLIC BLOOD PRESSURE: 70 MMHG | BODY MASS INDEX: 22.34 KG/M2

## 2022-11-18 DIAGNOSIS — R52 PAIN: ICD-10-CM

## 2022-11-18 DIAGNOSIS — M54.16 LUMBAR RADICULOPATHY: Primary | ICD-10-CM

## 2022-11-18 PROCEDURE — 25010000002 METHYLPREDNISOLONE PER 80 MG: Performed by: ANESTHESIOLOGY

## 2022-11-18 PROCEDURE — 0 IOPAMIDOL 41 % SOLUTION: Performed by: ANESTHESIOLOGY

## 2022-11-18 PROCEDURE — 77003 FLUOROGUIDE FOR SPINE INJECT: CPT

## 2022-11-18 RX ORDER — METHYLPREDNISOLONE ACETATE 80 MG/ML
80 INJECTION, SUSPENSION INTRA-ARTICULAR; INTRALESIONAL; INTRAMUSCULAR; SOFT TISSUE ONCE
Status: COMPLETED | OUTPATIENT
Start: 2022-11-18 | End: 2022-11-18

## 2022-11-18 RX ORDER — DIPHENOXYLATE HYDROCHLORIDE AND ATROPINE SULFATE 2.5; .025 MG/1; MG/1
TABLET ORAL DAILY
COMMUNITY

## 2022-11-18 RX ORDER — ASPIRIN 81 MG/1
81 TABLET, CHEWABLE ORAL DAILY
COMMUNITY

## 2022-11-18 RX ADMIN — IOPAMIDOL 10 ML: 408 INJECTION, SOLUTION INTRATHECAL at 13:05

## 2022-11-18 RX ADMIN — METHYLPREDNISOLONE ACETATE 80 MG: 80 INJECTION, SUSPENSION INTRA-ARTICULAR; INTRALESIONAL; INTRAMUSCULAR; SOFT TISSUE at 13:05

## 2022-11-18 NOTE — ANESTHESIA PROCEDURE NOTES
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 All questions were answered. The patient was discharged with appropriate instructions.  Prep:  Pt Position:prone  Sterile Tech:cap, gloves, mask and sterile barrier  Prep:chlorhexidine gluconate and isopropyl alcohol  Monitoring:blood pressure monitoring, continuous pulse oximetry and EKG  Procedure:Sedation: no     Approach:right paramedian  Guidance: fluoroscopy  Location:lumbar  Level:4-5 (Interlaminar)  Needle Type:Tuohy  Needle Gauge:20 G  Aspiration:negative  Medications:  Preservative Free Saline:3mL  Isovue:2mL  Comments:Isovue dye spread was consistent with epidural placement.Depomedrol:80 mg  Post Assessment:  Dressing:occlusive dressing applied  Pt Tolerance:patient tolerated the procedure well with no apparent complications  Complications:no

## 2022-11-18 NOTE — H&P
[facility]    History and Physical    Patient Name: Karen Arora  :  1955  MRN:  [id number]  Date of Admission: 2022    Subjective     Patient is a 67 y.o. female presents with chief complaint of chronic, constant, severe low back and lateral right lower extremity pain.  Onset of symptoms was gradual starting several years ago.  Symptoms are associated/aggravated by activity, standing, straining or walking for more than a few minutes. Symptoms improve with relaxation, pain medication, injection and rest.  On a pain scale from 0-10, she rates her pain as a 5 while at rest and a 10 with activity.  She describes the pain as stabbing and throbbing in nature.  She has responded favorably to lumbar epidural steroid injections in the past.  She was referred to the pain clinic for a series of lumbar epidural steroid injections.  In classical [facility] history Quartet    1. There is a very mild compression fracture involving the superior body  and endplate of T12 that appears to be new when compared to lumbar spine  plain films 2022. There is less than 10% loss of anterior, 10%  loss of central and no loss of posterior vertebral body height, and  there is some very minimal marrow edema in the superior body and  endplate of T12 and this is likely a late subacute to early chronic  compression deformity involving the superior body and endplate of T12  that is new when compared to 2022. Correlation with clinical  history is suggested. There is some prominent bilateral facet overgrowth  at T11-12 with exuberant degenerative marrow edema in the facets  bilaterally tracking into the pedicles bilaterally at T11 and T12 narrow  edema tracking into the lamina bilaterally at T11 and into the anterior  spinous processes T11 and again this is likely exuberant degenerative  marrow edema.  2. There is a mild scoliotic curvature of the lumbar spine with  levoscoliotic curvature of the upper to  mid lumbar spine with apex at  L2-3 and focal dextroscoliotic curvature of the lower lumbar spine with  apex at L4-5.  3. I see no canal or foraminal narrowing from T10 to L2. At L2-3 there  is mild left and mild-to-moderate right facet overgrowth and minimal  posterior disc osteophyte complex with mild canal and bilateral  foraminal narrowing at L2-3.  4. At L3-4 there is mild facet overgrowth, disc space narrowing,  degenerative endplate change, diffuse posterior disc osteophyte complex  with mild canal narrowing. There is spurring into the foramina and there  is mild left and there is moderate-to-severe right foraminal narrowing.  Endplate spurs press on the undersurface of the exiting right L3 nerve  root within the right foramen and lateral to the foramen to the far  right laterally at L3-4.  5. At L4-5 there is mild right and moderate left facet overgrowth and  diffuse posterior disc osteophyte complex with mild canal narrowing.   There is no right foraminal narrowing. There is eccentric spurring into  the left foramen and to the far left laterally moderately narrowing the  left foramen. Spurs abut the exiting left L4 nerve root.  6. At L5-S1 there is mild bilateral facet overgrowth. There is disc  space narrowing, degenerative endplate changes and posterior spurring  but there is no canal or lateral recess narrowing. There is spurring  into the foramina and there is mild-to-moderate right and there is  moderate left bony foraminal narrowing with endplate spurs pressing on  the undersurface of the exiting L5 nerve roots bilaterally.    The following portions of the patients history were reviewed and updated as appropriate: current medications, allergies, past medical history, past surgical history, past family history, past social history and problem list                Objective     Past Medical History:   Past Medical History:   Diagnosis Date   • Anxiety    • Arthritis    • Blunt head trauma    • Blunt  trauma of rib    • Breast cyst    • Chest pain    • Coronary artery disease    • Current every day smoker    • Depression    • Encounter for preventive health examination    • Esophageal reflux    • Fall    • Fatigue    • Heart disease    • High risk medication use    • Hyperlipidemia    • Hypertension    • Hypokalemia    • Insomnia    • Low back pain    • Lump or mass in breast    • Refused influenza vaccine    • Rib contusion    • Rib pain on left side    • Seasonal allergies    • Thyroid disorder    • Visit for routine gyn exam      Past Surgical History:   Past Surgical History:   Procedure Laterality Date   • EPIDURAL BLOCK     • HYSTERECTOMY     • SUBTOTAL HYSTERECTOMY       Family History:   Family History   Problem Relation Age of Onset   • Depression Mother    • Hyperlipidemia Mother    • Hypertension Mother    • Heart disease Mother    • Kidney disease Mother    • Anxiety disorder Mother    • Other Mother         Convulsions   • Rheum arthritis Mother    • Osteoarthritis Mother    • Migraines Mother    • Diabetes Mother    • Stroke Mother    • Thyroid disease Mother    • Kidney disease Father    • Anxiety disorder Brother    • Bipolar disorder Brother    • Rheum arthritis Brother    • Depression Brother    • Asthma Brother    • Other Brother         Overweight   • Osteoarthritis Paternal Grandmother    • Diabetes Paternal Grandmother    • Anxiety disorder Paternal Grandmother    • Hyperlipidemia Paternal Grandmother    • Hypertension Paternal Grandmother    • Anxiety disorder Paternal Grandfather    • Stroke Paternal Grandfather    • Other Paternal Grandfather         Convulsions   • Heart disease Paternal Grandfather    • Hyperlipidemia Paternal Grandfather    • Hypertension Paternal Grandfather    • Kidney disease Paternal Grandfather    • Thyroid disease Paternal Grandfather    • Osteoarthritis Paternal Grandfather    • Asthma Paternal Grandfather    • No Known Problems Other    • Kidney disease  "Maternal Aunt      Social History:   Social History     Socioeconomic History   • Marital status:    Tobacco Use   • Smoking status: Light Smoker     Packs/day: 0.25     Years: 0.50     Pack years: 0.13     Types: Cigarettes     Last attempt to quit: 2020     Years since quittin.9   • Smokeless tobacco: Never   Substance and Sexual Activity   • Alcohol use: Yes   • Drug use: Never   • Sexual activity: Not Currently     Partners: Male       Vital Signs Range for the last 24 hours  Temperature: Temp:  [36.2 °C (97.1 °F)] 36.2 °C (97.1 °F)   Temp Source: Temp src: Infrared   BP: BP: (131)/(87) 131/87   Pulse: Heart Rate:  [67] 67   Respirations: Resp:  [16] 16   SPO2: SpO2:  [98 %] 98 %   O2 Amount (l/min):     O2 Devices     Weight: Weight:  [63 kg (139 lb)] 63 kg (139 lb)     Flowsheet Rows    Flowsheet Row First Filed Value   Admission Height 167.6 cm (66\") Documented at 2022 1217   Admission Weight 63 kg (139 lb) Documented at 2022 1217          --------------------------------------------------------------------------------    Current Outpatient Medications   Medication Sig Dispense Refill   • ammonium lactate (AMLACTIN) 12 % cream Apply  topically to the appropriate area as directed As Needed for Dry Skin. 500 g 11   • aspirin 81 MG chewable tablet Chew 81 mg Daily.     • atorvastatin (LIPITOR) 40 MG tablet Take 1 tablet by mouth Daily. 90 tablet 3   • baclofen (LIORESAL) 10 MG tablet Take 1 tablet by mouth 3 (Three) Times a Day. 90 tablet 3   • bumetanide (BUMEX) 2 MG tablet Take 1 tablet by mouth Daily. 30 tablet 11   • cetirizine (zyrTEC) 10 MG tablet TAKE 1 TABLET BY MOUTH EVERY DAY 30 tablet 0   • escitalopram (LEXAPRO) 20 MG tablet Take 1 tablet by mouth Daily. 30 tablet 11   • losartan (COZAAR) 100 MG tablet Take 1 tablet by mouth Daily. 90 tablet 3   • meloxicam (MOBIC) 15 MG tablet Take 1 tablet by mouth Daily. 90 tablet 3   • multivitamin (MULTI-VITAMIN DAILY PO) Take  by " mouth Daily.     • Omega-3 Fatty Acids (fish oil) 1200 MG capsule capsule Take 2 capsules by mouth 2 (Two) Times a Day With Meals. 120 capsule 11   • omeprazole (priLOSEC) 40 MG capsule Take 1 capsule by mouth Daily. 30 capsule 10   • potassium chloride (KLOR-CON) 20 MEQ CR tablet Take 1 tablet by mouth Daily. 30 tablet 10   • varenicline (CHANTIX) 1 MG tablet Take 1 tablet by mouth 2 (Two) Times a Day for 140 days. 56 tablet 4   • vitamin E 100 UNIT capsule Take 100 Units by mouth Daily.     • zolpidem (AMBIEN) 5 MG tablet TAKE 1 AND 1/2 TABLET BY MOUTH  ONCE NIGHTLY AS NEEDED FOR SLEEP 45 tablet 2     No current facility-administered medications for this encounter.       --------------------------------------------------------------------------------  Assessment & Plan      Anesthesia Evaluation     Patient summary reviewed and Nursing notes reviewed   NPO Solid Status: > 8 hours  NPO Liquid Status: > 2 hours    Pain impairs ability to perform ADLs: Ambulation, Working, Sleeping and Exercise/Activity  Modalities previously tried to control pain with limited effectiveness within the last 4-6 weeks: OTC medications and Rest     Airway   Mallampati: II  TM distance: >3 FB  Neck ROM: full  Dental - normal exam     Pulmonary - normal exam    breath sounds clear to auscultation  (+) a smoker Current Abstained day of surgery,   Cardiovascular - normal exam    Rhythm: regular  Rate: normal    (+) hypertension, CAD, hyperlipidemia,   (-) angina, orthopnea, PND, [place]      Neuro/Psych  (+) psychiatric history Anxiety and Depression,    GI/Hepatic/Renal/Endo    (+)  GERD,  thyroid problem     Musculoskeletal     Abdominal    Substance History - negative use     OB/GYN negative ob/gyn ROS         Other   arthritis,               Diagnosis and Plan    Treatment Plan  ASA 3      Procedures: Lumbar Epidural Steroid Injection(LESI), With fluoroscopy,       Anesthetic plan and risks discussed with patient.        Alternative  management options include physical therapy, medical management with nonsteroidal anti-inflammatory medications or narcotics, chiropractic manipulation and surgical intervention.    1.  Lumbar epidural steroid injections, up to 3, spaced 1-2 weeks apart.  If pain control is acceptable after 1 or 2 injections, it was discussed with the patient that they may return for the subsequent injections if and when their pain returns.  The risks were discussed with the patient including failure of relief, worsening pain, Headache (post dural puncture headache), bleeding (epidural hematoma) and infection (epidural abscess or skin infection).  2.  Physical therapy exercises at home as prescribed by physical therapy or from the pain clinic handout (given to the patient).  Continuation of these exercises every day, or multiple times per week, even when the patient has good pain relief, was stressed to the patient as a preventative measure to decrease the frequency and severity of future pain episodes.  3.  Continue pain medicines as already prescribed.  If patient not currently taking any, it is recommended to begin Acetaminophen 1000 mg po q 8 hours.  If other medicines containing Acetaminophen are currently prescribed, maintain daily dose at 3000 mg.    4.  If they can tolerate NSAIDS, it is recommended to take Ibuprofen 600 mg po q 6 hours for 7 days during pain exacerbations.  Alternatively, they may substitute an NSAID of their choice (e.g. Aleve).  This may be taken at the same time as Acetaminophen.  5.  Heat and ice to the affected area as tolerated for pain control.  It was discussed that heating pads can cause burns.  6.  Daily low impact exercise such as walking or water exercise was recommended to maintain overall health and aid in weight control.   7.  Follow up as needed for subsequent injections.  8.  Patient was counseled to abstain from tobacco products.    Diagnosis     * Osseous stenosis of neural canal of  lumbar region [M99.89]

## 2022-11-23 ENCOUNTER — TELEPHONE (OUTPATIENT)
Dept: NEUROSURGERY | Facility: CLINIC | Age: 67
End: 2022-11-23

## 2022-11-23 NOTE — TELEPHONE ENCOUNTER
Caller: Karen Ward    Relationship to patient: Self    Best call back number: [phone]    Patient is needing:     PATIENT HAS APPT WITH DR GUAJARDO ON 12/2/22 BUT WOULD LIKE TO RESCHEDULE WHILE HER JOB IS ON SHUT DOWN, WOULD LIKE TO RE-SCHEDULE AROUND 12/28/22.  ALSO STATES SHE'D BE WILLING TO SEE DR MCPHERSON INSTEAD IF HE HAS SOMETHING OPEN AT THAT TIME AND DR GUAJARDO DOES NOT.    PLEASE CALL TO ADVISE.

## 2022-11-28 NOTE — TELEPHONE ENCOUNTER
I tried to call patient back however I was not able to leave . The closest available appt around the time patient requested is January 4th. Dr. Pickering does not treat compression fractures and I do not know his availability. I left the appt as it is for now. Please advise.

## 2022-12-02 ENCOUNTER — TELEPHONE (OUTPATIENT)
Dept: NEUROSURGERY | Facility: CLINIC | Age: 67
End: 2022-12-02

## 2022-12-02 NOTE — TELEPHONE ENCOUNTER
LM for patient that Dr. Lima was pulled into the OR for an emergency. We will call her with a new date and time.

## 2022-12-02 NOTE — PROGRESS NOTES
Subjective   Patient ID: Karen Arora is a 67 y.o. female is being seen for consultation today at the request of Livia Kebede MD for a T12 VCF.  MRI lumbar done on 9/25/22    History of Present Illness  67 y.o. female presents with chief complaint of chronic, constant, severe low back and lateral right lower extremity pain.  Onset of symptoms was gradual starting several years ago.  Symptoms are associated/aggravated by activity, standing, straining or walking for more than a few minutes. Symptoms improve with relaxation, pain medication, injection and rest.  On a pain scale from 0-10, she rates her pain as a 5 while at rest and a 10 with activity.  She describes the pain as stabbing and throbbing in nature.  She has responded favorably to lumbar epidural steroid injections in the past.  She was referred to the pain clinic for a series of lumbar epidural steroid injections.  She was also found to have a vertebral compression fracture on imaging and is sent for assessment for possible kyphoplasty at T12.  She has had several falls and is not sure if a fall preceded her onset of pain.  She also has neck pain and shoulder pain.  An MRI was obtained in September showing a compression fracture for which she presents today to review.      The following portions of the patient's history were reviewed and updated as appropriate:   She  has a past medical history of Anxiety, Arthritis, Blunt head trauma, Blunt trauma of rib, Breast cyst, Chest pain, Coronary artery disease, Current every day smoker, Depression, Encounter for preventive health examination, Esophageal reflux, Fall, Fatigue, Heart disease, High risk medication use, Hyperlipidemia, Hypertension, Hypokalemia, Insomnia, Low back pain, Lump or mass in breast, Refused influenza vaccine, Rib contusion, Rib pain on left side, Seasonal allergies, Thyroid disorder, and Visit for routine gyn exam.  She does not have any pertinent problems on file.  She   fever has a past surgical history that includes Hysterectomy; Subtotal Hysterectomy; and Epidural block injection.  Her family history includes Anxiety disorder in her brother, mother, paternal grandfather, and paternal grandmother; Asthma in her brother and paternal grandfather; Bipolar disorder in her brother; Depression in her brother and mother; Diabetes in her mother and paternal grandmother; Heart disease in her mother and paternal grandfather; Hyperlipidemia in her mother, paternal grandfather, and paternal grandmother; Hypertension in her mother, paternal grandfather, and paternal grandmother; Kidney disease in her father, maternal aunt, mother, and paternal grandfather; Migraines in her mother; No Known Problems in an other family member; Osteoarthritis in her mother, paternal grandfather, and paternal grandmother; Other in her brother, mother, and paternal grandfather; Rheum arthritis in her brother and mother; Stroke in her mother and paternal grandfather; Thyroid disease in her mother and paternal grandfather.  She  reports that she has been smoking cigarettes. She has a 0.13 pack-year smoking history. She has never used smokeless tobacco. She reports current alcohol use. She reports that she does not use drugs.  Current Outpatient Medications   Medication Sig Dispense Refill   • ammonium lactate (AMLACTIN) 12 % cream Apply  topically to the appropriate area as directed As Needed for Dry Skin. 500 g 11   • aspirin 81 MG chewable tablet Chew 81 mg Daily.     • atorvastatin (LIPITOR) 40 MG tablet Take 1 tablet by mouth Daily. 90 tablet 3   • baclofen (LIORESAL) 10 MG tablet Take 1 tablet by mouth 3 (Three) Times a Day. 90 tablet 3   • bumetanide (BUMEX) 2 MG tablet Take 1 tablet by mouth Daily. 30 tablet 11   • cetirizine (zyrTEC) 10 MG tablet TAKE 1 TABLET BY MOUTH EVERY DAY 30 tablet 0   • escitalopram (LEXAPRO) 20 MG tablet Take 1 tablet by mouth Daily. 30 tablet 11   • losartan (COZAAR) 100 MG tablet Take 1  tablet by mouth Daily. 90 tablet 3   • meloxicam (MOBIC) 15 MG tablet Take 1 tablet by mouth Daily. 90 tablet 3   • multivitamin (THERAGRAN) tablet tablet Take  by mouth Daily.     • Omega-3 Fatty Acids (fish oil) 1200 MG capsule capsule Take 2 capsules by mouth 2 (Two) Times a Day With Meals. 120 capsule 11   • omeprazole (priLOSEC) 40 MG capsule Take 1 capsule by mouth Daily. 30 capsule 10   • potassium chloride (KLOR-CON) 20 MEQ CR tablet Take 1 tablet by mouth Daily. 30 tablet 10   • varenicline (CHANTIX) 1 MG tablet Take 1 tablet by mouth 2 (Two) Times a Day for 140 days. 56 tablet 4   • vitamin E 100 UNIT capsule Take 100 Units by mouth Daily.     • zolpidem (AMBIEN) 5 MG tablet TAKE 1 AND 1/2 TABLET BY MOUTH  ONCE NIGHTLY AS NEEDED FOR SLEEP 45 tablet 2     No current facility-administered medications for this visit.     Current Outpatient Medications on File Prior to Visit   Medication Sig   • ammonium lactate (AMLACTIN) 12 % cream Apply  topically to the appropriate area as directed As Needed for Dry Skin.   • aspirin 81 MG chewable tablet Chew 81 mg Daily.   • atorvastatin (LIPITOR) 40 MG tablet Take 1 tablet by mouth Daily.   • baclofen (LIORESAL) 10 MG tablet Take 1 tablet by mouth 3 (Three) Times a Day.   • bumetanide (BUMEX) 2 MG tablet Take 1 tablet by mouth Daily.   • cetirizine (zyrTEC) 10 MG tablet TAKE 1 TABLET BY MOUTH EVERY DAY   • escitalopram (LEXAPRO) 20 MG tablet Take 1 tablet by mouth Daily.   • losartan (COZAAR) 100 MG tablet Take 1 tablet by mouth Daily.   • meloxicam (MOBIC) 15 MG tablet Take 1 tablet by mouth Daily.   • multivitamin (THERAGRAN) tablet tablet Take  by mouth Daily.   • Omega-3 Fatty Acids (fish oil) 1200 MG capsule capsule Take 2 capsules by mouth 2 (Two) Times a Day With Meals.   • omeprazole (priLOSEC) 40 MG capsule Take 1 capsule by mouth Daily.   • potassium chloride (KLOR-CON) 20 MEQ CR tablet Take 1 tablet by mouth Daily.   • varenicline (CHANTIX) 1 MG tablet Take 1  tablet by mouth 2 (Two) Times a Day for 140 days.   • vitamin E 100 UNIT capsule Take 100 Units by mouth Daily.   • zolpidem (AMBIEN) 5 MG tablet TAKE 1 AND 1/2 TABLET BY MOUTH  ONCE NIGHTLY AS NEEDED FOR SLEEP     No current facility-administered medications on file prior to visit.     She is allergic to penicillins..    Review of Systems   Constitutional: Negative for chills and fever.   HENT: Negative for ear pain and tinnitus.    Eyes: Negative for pain and visual disturbance.   Respiratory: Negative for cough and shortness of breath.    Cardiovascular: Negative for chest pain and palpitations.   Gastrointestinal: Positive for vomiting. Negative for nausea.        No b/b incontinence   Genitourinary: Negative for difficulty urinating and enuresis.   Musculoskeletal: Positive for back pain (radiates to bilateral legs to ankles) and gait problem.   Skin: Negative for rash.   Neurological: Positive for weakness (legs) and numbness (back/legs).   Psychiatric/Behavioral: Negative for sleep disturbance.       Objective    Vitals:    12/06/22 0943   BP: 130/82   Pulse: 120   Resp: 16   Temp: 97.2 °F (36.2 °C)     Body mass index is 22.11 kg/m².    Physical Exam  Vitals and nursing note reviewed.   Constitutional:       General: She is not in acute distress.     Appearance: She is normal weight.   HENT:      Head: Normocephalic.      Nose: Nose normal.      Mouth/Throat:      Mouth: Mucous membranes are moist.   Eyes:      Extraocular Movements: Extraocular movements intact.      Conjunctiva/sclera: Conjunctivae normal.      Pupils: Pupils are equal, round, and reactive to light.   Cardiovascular:      Rate and Rhythm: Normal rate.   Pulmonary:      Effort: Pulmonary effort is normal.   Musculoskeletal:         General: Normal range of motion.      Cervical back: Normal range of motion.   Skin:     General: Skin is warm and dry.   Neurological:      General: No focal deficit present.      Mental Status: She is alert and  oriented to person, place, and time.      Cranial Nerves: No cranial nerve deficit.      Sensory: No sensory deficit.      Motor: No weakness.      Coordination: Coordination normal.   Psychiatric:         Mood and Affect: Mood normal.         Behavior: Behavior normal.         Thought Content: Thought content normal.         Judgment: Judgment normal.       Neurologic Exam     Mental Status   Oriented to person, place, and time.     Cranial Nerves     CN III, IV, VI   Pupils are equal, round, and reactive to light.      Assessment & Plan   Independent Review of Radiographic Studies:    The MRI of the lumbar spine without contrast dated 2022 was reviewed with the patient and shows a mild compression fracture of the superior endplate of T12.  There is less than 10% loss of height and minimal marrow edema.  Medical Decision Makin-year-old female complaining of back and neck pain with MRI evidence of a compression fracture at T12 in 2022.  Patient now sent for possible kyphoplasty but no recent imaging obtained.  The patient is also complaining of neck pain with no cervical imaging performed.  Patient has had multiple falls and is not sure if her back pain occurred in correlation with a fall.  She is a smoker with hypertension and hyperlipidemia cerebrovascular risk factors.  These are being treated medically with Lipitor and aspirin.  Patient may be a candidate for kyphoplasty depending on more recent imaging and an MRI of both the cervical and lumbar region were ordered.  Patient has some radicular symptoms and referral to spinal neurosurgery is likely to be needed.  Diagnoses and all orders for this visit:    1. Compression fracture of T12 vertebra with delayed healing (Primary)  -     MRI Lumbar Spine Without Contrast; Future    2. Lumbar pain  -     MRI Lumbar Spine Without Contrast; Future    3. Neck pain  -     MRI Cervical Spine Without Contrast; Future    4. Essential hypertension    5.  Hyperlipidemia, unspecified hyperlipidemia type      Return in about 2 weeks (around 12/20/2022) for Recheck, MRI of the cervical and lumbar spine.

## 2022-12-06 ENCOUNTER — OFFICE VISIT (OUTPATIENT)
Dept: NEUROSURGERY | Facility: CLINIC | Age: 67
End: 2022-12-06

## 2022-12-06 VITALS
SYSTOLIC BLOOD PRESSURE: 130 MMHG | DIASTOLIC BLOOD PRESSURE: 82 MMHG | HEART RATE: 120 BPM | TEMPERATURE: 97.2 F | RESPIRATION RATE: 16 BRPM | WEIGHT: 137 LBS | BODY MASS INDEX: 22.02 KG/M2 | HEIGHT: 66 IN

## 2022-12-06 DIAGNOSIS — I10 ESSENTIAL HYPERTENSION: ICD-10-CM

## 2022-12-06 DIAGNOSIS — S22.080G COMPRESSION FRACTURE OF T12 VERTEBRA WITH DELAYED HEALING: Primary | ICD-10-CM

## 2022-12-06 DIAGNOSIS — M54.50 LUMBAR PAIN: ICD-10-CM

## 2022-12-06 DIAGNOSIS — M54.2 NECK PAIN: ICD-10-CM

## 2022-12-06 DIAGNOSIS — E78.5 HYPERLIPIDEMIA, UNSPECIFIED HYPERLIPIDEMIA TYPE: ICD-10-CM

## 2022-12-06 PROCEDURE — 99204 OFFICE O/P NEW MOD 45 MIN: CPT | Performed by: RADIOLOGY

## 2022-12-09 ENCOUNTER — PATIENT ROUNDING (BHMG ONLY) (OUTPATIENT)
Dept: NEUROSURGERY | Facility: CLINIC | Age: 67
End: 2022-12-09

## 2022-12-12 NOTE — PROGRESS NOTES
Karen Arora  You 2 days ago       Thanks        Karen Arora  You 2 days ago       I hurt  really bad today I just want it to be better        Karen Palacio 2 days ago     SPIKE  Hi Ally.     Everything went well!  I was informed about my surgery and I understood what doc said because he showed me a slide and explained the procedure to me!     He gets two thumbs up

## 2022-12-14 DIAGNOSIS — J30.1 ALLERGIC RHINITIS DUE TO POLLEN, UNSPECIFIED SEASONALITY: ICD-10-CM

## 2022-12-15 RX ORDER — CETIRIZINE HYDROCHLORIDE 10 MG/1
TABLET ORAL
Qty: 30 TABLET | Refills: 0 | Status: SHIPPED | OUTPATIENT
Start: 2022-12-15 | End: 2023-01-25 | Stop reason: SDUPTHER

## 2023-01-08 ENCOUNTER — APPOINTMENT (OUTPATIENT)
Dept: MRI IMAGING | Facility: HOSPITAL | Age: 68
End: 2023-01-08
Payer: MEDICARE

## 2023-01-08 ENCOUNTER — HOSPITAL ENCOUNTER (OUTPATIENT)
Dept: MRI IMAGING | Facility: HOSPITAL | Age: 68
Discharge: HOME | End: 2023-01-08
Payer: MEDICARE

## 2023-01-08 ENCOUNTER — HOSPITAL ENCOUNTER (OUTPATIENT)
Dept: MRI IMAGING | Facility: HOSPITAL | Age: 68
End: 2023-01-08
Payer: MEDICARE

## 2023-01-08 DIAGNOSIS — M54.2 NECK PAIN: ICD-10-CM

## 2023-01-08 DIAGNOSIS — S22.080G COMPRESSION FRACTURE OF T12 VERTEBRA WITH DELAYED HEALING: ICD-10-CM

## 2023-01-08 DIAGNOSIS — M54.50 LUMBAR PAIN: ICD-10-CM

## 2023-01-08 PROCEDURE — 72148 MRI LUMBAR SPINE W/O DYE: CPT

## 2023-01-08 PROCEDURE — 72141 MRI NECK SPINE W/O DYE: CPT

## 2023-01-20 ENCOUNTER — TELEPHONE (OUTPATIENT)
Dept: FAMILY MEDICINE CLINIC | Facility: CLINIC | Age: 68
End: 2023-01-20

## 2023-01-20 NOTE — TELEPHONE ENCOUNTER
Caller: Karen Gaston    Relationship to patient: Self    Best call back number: [phone]    Patient is needing: SHE RECEIVED A CALL TO FIND OUT WHAT TYPE OF DOG SHE HAS AND IT IS A MINI PINCHER.  SHE NEEDS TO HAVE THAT FORM FILLED OUT OR SHE WILL HAVE TO PAY $500.  PLEASE CALL WHEN READY.   SHE ALSO WOULD LIKE A CALL REGARDING HER TEST RESULTS.

## 2023-01-24 ENCOUNTER — TELEPHONE (OUTPATIENT)
Dept: FAMILY MEDICINE CLINIC | Facility: CLINIC | Age: 68
End: 2023-01-24

## 2023-01-24 ENCOUNTER — TELEPHONE (OUTPATIENT)
Dept: NEUROSURGERY | Facility: CLINIC | Age: 68
End: 2023-01-24
Payer: MEDICARE

## 2023-01-24 NOTE — TELEPHONE ENCOUNTER
Caller: Karen Gaston    Relationship: Self    Best call back number: [phone]    What is the best time to reach you: ANY    Who are you requesting to speak with (clinical staff, provider,  specific staff member): CLINICAL STAFF       What was the call regarding: HAVING SEVERE BACK PAIN AND REQUESTING SOMETHING FOR PAIN.      Do you require a callback: YES

## 2023-01-24 NOTE — TELEPHONE ENCOUNTER
Spoke with pt and gave her info per Dr Ramirez.  He wants her to see someone.   in 51.  Can you take care of this please?

## 2023-01-24 NOTE — TELEPHONE ENCOUNTER
Pt had MRI lumbar/MRI cervical on 1/8/23 ordered by you.  Does she need to see you?  [phone]  She is having excruciating pain right now.

## 2023-01-24 NOTE — TELEPHONE ENCOUNTER
I called and LVM for patient to call the office back. Patient can be scheduled first avail FU with Dr. Pickering. [facility] ok to schedule as well as anyone in office. Thanks.

## 2023-01-24 NOTE — TELEPHONE ENCOUNTER
LMTCB    Patient will need to schedule an appointment to be seen for her back pain.    **HUB/** MAY RELAY MESSAGE

## 2023-01-25 DIAGNOSIS — J30.1 ALLERGIC RHINITIS DUE TO POLLEN, UNSPECIFIED SEASONALITY: ICD-10-CM

## 2023-01-25 RX ORDER — CETIRIZINE HYDROCHLORIDE 10 MG/1
10 TABLET ORAL DAILY
Qty: 90 TABLET | Refills: 2 | Status: SHIPPED | OUTPATIENT
Start: 2023-01-25

## 2023-02-01 ENCOUNTER — OFFICE VISIT (OUTPATIENT)
Dept: FAMILY MEDICINE CLINIC | Facility: CLINIC | Age: 68
End: 2023-02-01
Payer: MEDICARE

## 2023-02-01 VITALS
BODY MASS INDEX: 23.24 KG/M2 | OXYGEN SATURATION: 97 % | HEART RATE: 113 BPM | SYSTOLIC BLOOD PRESSURE: 132 MMHG | WEIGHT: 144 LBS | TEMPERATURE: 96.8 F | DIASTOLIC BLOOD PRESSURE: 70 MMHG

## 2023-02-01 DIAGNOSIS — J06.9 UPPER RESPIRATORY TRACT INFECTION, UNSPECIFIED TYPE: Primary | ICD-10-CM

## 2023-02-01 LAB
EXPIRATION DATE: NORMAL
EXPIRATION DATE: NORMAL
FLUAV AG UPPER RESP QL IA.RAPID: NOT DETECTED
FLUBV AG UPPER RESP QL IA.RAPID: NOT DETECTED
INTERNAL CONTROL: NORMAL
INTERNAL CONTROL: NORMAL
Lab: NORMAL
Lab: NORMAL
S PYO AG THROAT QL: NEGATIVE
SARS-COV-2 AG UPPER RESP QL IA.RAPID: NOT DETECTED

## 2023-02-01 PROCEDURE — 87880 STREP A ASSAY W/OPTIC: CPT | Performed by: STUDENT IN AN ORGANIZED HEALTH CARE EDUCATION/TRAINING PROGRAM

## 2023-02-01 PROCEDURE — 99213 OFFICE O/P EST LOW 20 MIN: CPT | Performed by: STUDENT IN AN ORGANIZED HEALTH CARE EDUCATION/TRAINING PROGRAM

## 2023-02-01 PROCEDURE — 87428 SARSCOV & INF VIR A&B AG IA: CPT | Performed by: STUDENT IN AN ORGANIZED HEALTH CARE EDUCATION/TRAINING PROGRAM

## 2023-02-01 RX ORDER — FLUTICASONE PROPIONATE 50 MCG
2 SPRAY, SUSPENSION (ML) NASAL DAILY
Qty: 16 G | Refills: 2 | Status: SHIPPED | OUTPATIENT
Start: 2023-02-01

## 2023-02-01 RX ORDER — ACETAMINOPHEN AND CHLORPHENIRAMINE MALEATE 325; 2 MG/1; MG/1
1 TABLET, FILM COATED ORAL 2 TIMES DAILY
Qty: 20 TABLET | Refills: 0 | Status: SHIPPED | OUTPATIENT
Start: 2023-02-01

## 2023-02-01 NOTE — PROGRESS NOTES
"Chief Complaint  Nasal Congestion (Patient head is hurting and throat is hurting patient said this has been going on for about 3 days, needs something for back pain )    Subjective        Karen Gaston presents to [facility]  History of Present Illness     Runny nose, eyes, headache, ear ache, diarrhea, and sore throat for the last three days. Does struggle with similar sometimes but this time is hurting more than normal. Takes zyrtec every day which is not helping at all. Did previously get allergy shots. Stopped flonase a few years ago. Cost increased so she stopped taking the medication. Two people at work had COVID and one person had strep. Bosses son had flu and strep at the same time. Has been drinking a lot of water.     Objective   Vital Signs:  /70 (BP Location: Left arm, Patient Position: Sitting, Cuff Size: Adult)   Pulse 113   Temp 96.8 °F (36 °C)   Wt 65.3 kg (144 lb)   SpO2 97%   BMI 23.24 kg/m²   Estimated body mass index is 23.24 kg/m² as calculated from the following:    Height as of 12/6/22: 167.6 cm (66\").    Weight as of this encounter: 65.3 kg (144 lb).       BMI is within normal parameters. No other follow-up for BMI required.      Physical Exam  Vitals and nursing note reviewed.   Constitutional:       General: She is not in acute distress.     Appearance: Normal appearance. She is not ill-appearing.   HENT:      Head: Normocephalic and atraumatic.      Nose: Rhinorrhea present.      Mouth/Throat:      Mouth: Mucous membranes are moist.      Comments: Post nasal drainage   Eyes:      Extraocular Movements: Extraocular movements intact.      Conjunctiva/sclera: Conjunctivae normal.   Cardiovascular:      Rate and Rhythm: Normal rate and regular rhythm.      Heart sounds: Normal heart sounds. No murmur heard.    No gallop.   Pulmonary:      Effort: Pulmonary effort is normal. No respiratory distress.      Breath sounds: Normal breath sounds. No " stridor. No wheezing, rhonchi or rales.   Chest:      Chest wall: No tenderness.   Skin:     General: Skin is warm and dry.   Neurological:      General: No focal deficit present.      Mental Status: She is alert and oriented to person, place, and time. Mental status is at baseline.   Psychiatric:         Mood and Affect: Mood normal.         Behavior: Behavior normal.        Result Review :                   Assessment and Plan   Diagnoses and all orders for this visit:    1. Upper respiratory tract infection, unspecified type (Primary)  -     POCT SARS-CoV-2 Antigen SOPHIA + Flu  -     POCT rapid strep A  -     Chlorpheniramine-Acetaminophen (CORICIDIN) 2-325 MG tablet; Take 1 tablet by mouth 2 (Two) Times a Day.  Dispense: 20 tablet; Refill: 0  -     fluticasone (FLONASE) 50 MCG/ACT nasal spray; 2 sprays into the nostril(s) as directed by provider Daily.  Dispense: 16 g; Refill: 2             Follow Up   Return if symptoms worsen or fail to improve.  Patient was given instructions and counseling regarding her condition or for health maintenance advice. Please see specific information pulled into the AVS if appropriate.

## 2023-03-31 DIAGNOSIS — G47.00 INSOMNIA, UNSPECIFIED TYPE: ICD-10-CM

## 2023-03-31 RX ORDER — ZOLPIDEM TARTRATE 5 MG/1
5 TABLET ORAL NIGHTLY PRN
Qty: 30 TABLET | Refills: 0 | Status: SHIPPED | OUTPATIENT
Start: 2023-03-31

## 2023-03-31 NOTE — TELEPHONE ENCOUNTER
Spoke with patient, she said she takes 1 1/2 tablets each night so she wants 45 tablets to last her for 30 days and she said she already see's a psychiatrist but haven't seen them in a while and they don't fill her ambien.

## 2023-03-31 NOTE — TELEPHONE ENCOUNTER
Caller: Karen Gaston    Relationship: Self    Requested Prescriptions:   Requested Prescriptions     Pending Prescriptions Disp Refills   • zolpidem (AMBIEN) 5 MG tablet 45 tablet 2        Pharmacy where request should be sent: [facility] [id number] [place] [id number]  AT [facility] [address]  - [phone]  - [phone] FX     Last office visit with prescribing clinician: 2/1/2023   Last telemedicine visit with prescribing clinician: Visit date not found   Next office visit with prescribing clinician: 10/6/2023     Additional details provided by patient: PATIENT STATES HER BOTTLE SHOWS THAT SHE HAS ENOUGH REFILLS ON THIS MEDICATION UNTIL SHE SEES DR. GUTIERREZ, BUT [facility] HAS TOLD HER SHE DOES NOT HAVE ANY REFILLS.   PATIENT RAN OUT ON Monday.       Does the patient have less than a 3 day supply:  [x] Yes  [] No    Would you like a call back once the refill request has been completed: [] Yes [x] No    If the office needs to give you a call back, can they leave a voicemail: [] Yes [x] No    Estefanía Lau Rep   03/31/23 08:30 EDT

## 2023-06-02 DIAGNOSIS — K21.9 GASTROESOPHAGEAL REFLUX DISEASE WITHOUT ESOPHAGITIS: ICD-10-CM

## 2023-06-02 RX ORDER — OMEPRAZOLE 40 MG/1
40 CAPSULE, DELAYED RELEASE ORAL DAILY
Qty: 30 CAPSULE | Refills: 10 | Status: SHIPPED | OUTPATIENT
Start: 2023-06-02

## 2023-09-13 DIAGNOSIS — G47.00 INSOMNIA, UNSPECIFIED TYPE: ICD-10-CM

## 2023-09-14 RX ORDER — ZOLPIDEM TARTRATE 5 MG/1
TABLET ORAL
Qty: 30 TABLET | Refills: 0 | Status: SHIPPED | OUTPATIENT
Start: 2023-09-14

## 2023-10-10 DIAGNOSIS — Z12.31 SCREENING MAMMOGRAM FOR BREAST CANCER: Primary | ICD-10-CM

## 2023-10-18 DIAGNOSIS — E78.5 HYPERLIPIDEMIA, UNSPECIFIED HYPERLIPIDEMIA TYPE: ICD-10-CM

## 2023-10-18 DIAGNOSIS — F32.A DEPRESSION, UNSPECIFIED DEPRESSION TYPE: ICD-10-CM

## 2023-10-18 RX ORDER — ATORVASTATIN CALCIUM 40 MG/1
40 TABLET, FILM COATED ORAL DAILY
Qty: 90 TABLET | Refills: 3 | OUTPATIENT
Start: 2023-10-18

## 2023-10-18 RX ORDER — ESCITALOPRAM OXALATE 20 MG/1
20 TABLET ORAL DAILY
Qty: 30 TABLET | Refills: 11 | OUTPATIENT
Start: 2023-10-18

## 2023-10-18 NOTE — TELEPHONE ENCOUNTER
Caller: Karen Gaston    Relationship: Self    Best call back number: [phone]     Requested Prescriptions:   Requested Prescriptions     Pending Prescriptions Disp Refills    atorvastatin (LIPITOR) 40 MG tablet 90 tablet 3     Sig: Take 1 tablet by mouth Daily.    escitalopram (LEXAPRO) 20 MG tablet 30 tablet 11     Sig: Take 1 tablet by mouth Daily.        Pharmacy where request should be sent: [facility] [id number] [place] [address] [address] AT [facility] [address] [facility] [phone] [facility] [phone] FX     Last office visit with prescribing clinician: 2/1/2023   Last telemedicine visit with prescribing clinician: Visit date not found   Next office visit with prescribing clinician: 4/18/2024     Additional details provided by patient: PATIENT STATES SHE IS COMPLETELY OUT     Does the patient have less than a 3 day supply:  [x] Yes  [] No      Estefanía Shannon Rep   10/18/23 08:56 EDT

## 2023-10-24 ENCOUNTER — OFFICE VISIT (OUTPATIENT)
Dept: FAMILY MEDICINE CLINIC | Facility: CLINIC | Age: 68
End: 2023-10-24
Payer: MEDICAID

## 2023-10-24 VITALS
DIASTOLIC BLOOD PRESSURE: 98 MMHG | WEIGHT: 133 LBS | TEMPERATURE: 97.7 F | OXYGEN SATURATION: 99 % | BODY MASS INDEX: 21.47 KG/M2 | SYSTOLIC BLOOD PRESSURE: 150 MMHG | HEART RATE: 95 BPM

## 2023-10-24 DIAGNOSIS — J30.2 SEASONAL ALLERGIES: Primary | ICD-10-CM

## 2023-10-24 DIAGNOSIS — F32.A DEPRESSION, UNSPECIFIED DEPRESSION TYPE: ICD-10-CM

## 2023-10-24 DIAGNOSIS — F32.A ANXIETY AND DEPRESSION: ICD-10-CM

## 2023-10-24 DIAGNOSIS — I10 ESSENTIAL HYPERTENSION: ICD-10-CM

## 2023-10-24 DIAGNOSIS — J06.9 UPPER RESPIRATORY TRACT INFECTION, UNSPECIFIED TYPE: ICD-10-CM

## 2023-10-24 DIAGNOSIS — Z13.820 SCREENING FOR OSTEOPOROSIS: ICD-10-CM

## 2023-10-24 DIAGNOSIS — J30.1 ALLERGIC RHINITIS DUE TO POLLEN, UNSPECIFIED SEASONALITY: ICD-10-CM

## 2023-10-24 DIAGNOSIS — F41.9 ANXIETY AND DEPRESSION: ICD-10-CM

## 2023-10-24 DIAGNOSIS — E78.5 HYPERLIPIDEMIA, UNSPECIFIED HYPERLIPIDEMIA TYPE: ICD-10-CM

## 2023-10-24 DIAGNOSIS — E05.00 FLAJANI DISEASE: ICD-10-CM

## 2023-10-24 DIAGNOSIS — E07.9 DISORDER OF THYROID GLAND: ICD-10-CM

## 2023-10-24 DIAGNOSIS — K21.9 GASTROESOPHAGEAL REFLUX DISEASE WITHOUT ESOPHAGITIS: ICD-10-CM

## 2023-10-24 DIAGNOSIS — R60.9 EDEMA, UNSPECIFIED TYPE: ICD-10-CM

## 2023-10-24 DIAGNOSIS — M19.90 ARTHRITIS: ICD-10-CM

## 2023-10-24 DIAGNOSIS — M54.50 LUMBAR PAIN: ICD-10-CM

## 2023-10-24 DIAGNOSIS — G47.00 INSOMNIA, UNSPECIFIED TYPE: ICD-10-CM

## 2023-10-24 DIAGNOSIS — Z12.31 ENCOUNTER FOR SCREENING MAMMOGRAM FOR MALIGNANT NEOPLASM OF BREAST: ICD-10-CM

## 2023-10-24 RX ORDER — ATORVASTATIN CALCIUM 40 MG/1
40 TABLET, FILM COATED ORAL DAILY
Qty: 30 TABLET | Refills: 3 | Status: SHIPPED | OUTPATIENT
Start: 2023-10-24

## 2023-10-24 RX ORDER — ZOLPIDEM TARTRATE 5 MG/1
5 TABLET ORAL NIGHTLY PRN
Qty: 30 TABLET | Refills: 0 | Status: SHIPPED | OUTPATIENT
Start: 2023-10-24

## 2023-10-24 RX ORDER — ESCITALOPRAM OXALATE 20 MG/1
20 TABLET ORAL DAILY
Qty: 30 TABLET | Refills: 11 | Status: SHIPPED | OUTPATIENT
Start: 2023-10-24 | End: 2023-10-24 | Stop reason: SDUPTHER

## 2023-10-24 RX ORDER — LOSARTAN POTASSIUM 100 MG/1
100 TABLET ORAL DAILY
Qty: 30 TABLET | Refills: 3 | Status: SHIPPED | OUTPATIENT
Start: 2023-10-24

## 2023-10-24 RX ORDER — ESCITALOPRAM OXALATE 20 MG/1
20 TABLET ORAL DAILY
Qty: 30 TABLET | Refills: 1 | Status: SHIPPED | OUTPATIENT
Start: 2023-10-24

## 2023-10-24 RX ORDER — BUMETANIDE 2 MG/1
2 TABLET ORAL DAILY
Qty: 30 TABLET | Refills: 11 | Status: SHIPPED | OUTPATIENT
Start: 2023-10-24

## 2023-10-24 RX ORDER — FLUTICASONE PROPIONATE 50 MCG
2 SPRAY, SUSPENSION (ML) NASAL DAILY
Qty: 16 G | Refills: 2 | Status: SHIPPED | OUTPATIENT
Start: 2023-10-24

## 2023-10-24 RX ORDER — BACLOFEN 10 MG/1
10 TABLET ORAL 3 TIMES DAILY
Qty: 90 TABLET | Refills: 3 | Status: SHIPPED | OUTPATIENT
Start: 2023-10-24

## 2023-10-24 RX ORDER — CETIRIZINE HYDROCHLORIDE 10 MG/1
10 TABLET ORAL DAILY
Qty: 30 TABLET | Refills: 2 | Status: SHIPPED | OUTPATIENT
Start: 2023-10-24

## 2023-10-24 RX ORDER — MELOXICAM 15 MG/1
15 TABLET ORAL DAILY
Qty: 30 TABLET | Refills: 3 | Status: SHIPPED | OUTPATIENT
Start: 2023-10-24

## 2023-10-24 RX ORDER — POTASSIUM CHLORIDE 20 MEQ/1
20 TABLET, EXTENDED RELEASE ORAL DAILY
Qty: 30 TABLET | Refills: 10 | Status: SHIPPED | OUTPATIENT
Start: 2023-10-24

## 2023-10-24 RX ORDER — OMEPRAZOLE 40 MG/1
40 CAPSULE, DELAYED RELEASE ORAL DAILY
Qty: 30 CAPSULE | Refills: 10 | Status: SHIPPED | OUTPATIENT
Start: 2023-10-24

## 2023-10-24 NOTE — PROGRESS NOTES
"Chief Complaint  Hypertension    Subjective        Karen Gaston presents to [facility]  History of Present Illness    Answers submitted by the patient for this visit:  Other (Submitted on 10/22/2023)  Please describe your symptoms.: I'm totally out of all my medications and I would like for Dr Barlow to recommend  me a Therapist so I can schedule appointment with him/her in February 2024.  I will also need to see a rheumatologist in February 2024 when my Medicare starts back up.  Have you had these symptoms before?: Yes  How long have you been having these symptoms?: Greater than 2 weeks  Please list any medications you are currently taking for this condition.: You all have a list of all my medications.  Please describe any probable cause for these symptoms. : YOu all have my medical chart.  Primary Reason for Visit (Submitted on 10/22/2023)  What is the primary reason for your visit?: Other    Wants to See Dr. Howard rheumatology when her medicare kicks in.   Has not been to a therapist in about 15 years. Family has lots of mental illness. Son has schizophrenia, daughter has bipolar, mother is schizophrenic. No thoughts of suicide or anything happening. Her brain wonders and runs. One day will be sad and one day happy.     Prefers to wait for labs until January when her insurance kicks in.     Objective   Vital Signs:  /98 (BP Location: Left arm, Patient Position: Sitting, Cuff Size: Large Adult)   Pulse 95   Temp 97.7 °F (36.5 °C)   Wt 60.3 kg (133 lb)   SpO2 99%   BMI 21.47 kg/m²   Estimated body mass index is 21.47 kg/m² as calculated from the following:    Height as of 12/6/22: 167.6 cm (66\").    Weight as of this encounter: 60.3 kg (133 lb).       BMI is within normal parameters. No other follow-up for BMI required.      Physical Exam  Vitals and nursing note reviewed.   Constitutional:       General: She is not in acute distress.     Appearance: Normal " appearance. She is not ill-appearing.   HENT:      Head: Normocephalic and atraumatic.      Nose: Nose normal.      Mouth/Throat:      Mouth: Mucous membranes are moist.   Eyes:      Extraocular Movements: Extraocular movements intact.      Conjunctiva/sclera: Conjunctivae normal.   Cardiovascular:      Rate and Rhythm: Normal rate and regular rhythm.      Heart sounds: Normal heart sounds. No murmur heard.     No gallop.   Pulmonary:      Effort: Pulmonary effort is normal. No respiratory distress.      Breath sounds: Normal breath sounds. No stridor. No wheezing, rhonchi or rales.   Chest:      Chest wall: No tenderness.   Skin:     General: Skin is warm and dry.   Neurological:      General: No focal deficit present.      Mental Status: She is alert and oriented to person, place, and time. Mental status is at baseline.   Psychiatric:         Mood and Affect: Mood normal.         Behavior: Behavior normal.        Result Review :                   Assessment and Plan   Diagnoses and all orders for this visit:    1. Seasonal allergies (Primary)    2. Depression, unspecified depression type  -     escitalopram (LEXAPRO) 20 MG tablet; Take 1 tablet by mouth Daily.  Dispense: 30 tablet; Refill: 1    3. Hyperlipidemia, unspecified hyperlipidemia type  -     atorvastatin (LIPITOR) 40 MG tablet; Take 1 tablet by mouth Daily.  Dispense: 30 tablet; Refill: 3  -     Lipid panel; Future    4. Edema, unspecified type  -     bumetanide (BUMEX) 2 MG tablet; Take 1 tablet by mouth Daily.  Dispense: 30 tablet; Refill: 11    5. Lumbar pain  -     baclofen (LIORESAL) 10 MG tablet; Take 1 tablet by mouth 3 (Three) Times a Day.  Dispense: 90 tablet; Refill: 3    6. Allergic rhinitis due to pollen, unspecified seasonality  -     cetirizine (zyrTEC) 10 MG tablet; Take 1 tablet by mouth Daily.  Dispense: 30 tablet; Refill: 2    7. Essential hypertension  -     losartan (COZAAR) 100 MG tablet; Take 1 tablet by mouth Daily.  Dispense: 30  tablet; Refill: 3  -     potassium chloride (KLOR-CON) 20 MEQ CR tablet; Take 1 tablet by mouth Daily.  Dispense: 30 tablet; Refill: 10  -     Comprehensive metabolic panel; Future    8. Arthritis  -     meloxicam (MOBIC) 15 MG tablet; Take 1 tablet by mouth Daily.  Dispense: 30 tablet; Refill: 3    9. Gastroesophageal reflux disease without esophagitis  -     omeprazole (priLOSEC) 40 MG capsule; Take 1 capsule by mouth Daily.  Dispense: 30 capsule; Refill: 10    10. Insomnia, unspecified type  Comments:  much improved on current dose  refills today  Orders:  -     zolpidem (AMBIEN) 5 MG tablet; Take 1 tablet by mouth At Night As Needed for Sleep.  Dispense: 30 tablet; Refill: 0    11. Upper respiratory tract infection, unspecified type  -     fluticasone (FLONASE) 50 MCG/ACT nasal spray; 2 sprays into the nostril(s) as directed by provider Daily.  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 Encounter for screening mammogram for malignant neoplasm of breast  -     Mammo Screening Digital Tomosynthesis Bilateral With CAD    14. Disorder of thyroid gland  -     TSH Rfx On Abnormal To Free T4; Future    15. Screening for osteoporosis  -     DEXA Bone Density Axial; Future    16. Anxiety and depression  -     Ambulatory Referral to Psychology             Follow Up   Return in about 3 months (around 1/24/2024) for Annual physical.  Patient was given instructions and counseling regarding her condition or for health maintenance advice. Please see specific information pulled into the AVS if appropriate.

## 2024-01-04 ENCOUNTER — TELEPHONE (OUTPATIENT)
Dept: FAMILY MEDICINE CLINIC | Facility: CLINIC | Age: 69
End: 2024-01-04

## 2024-01-04 NOTE — TELEPHONE ENCOUNTER
Caller: Karen Gaston    Relationship: Self    Best call back number: [phone]     What was the call regarding: PATIENT IS MOVING AND WILL NEED A NEW LETTER OF MENTAL SUPPORT/THERAPY FOR HER DOG. SHE HAS A FOLLOW UP WITH PROVIDER 02/06 AND CAN WAIT UNTIL THEN TO .

## 2024-01-05 NOTE — TELEPHONE ENCOUNTER
Patient states that it is a miniature pinscher and that she needs this dog to help with her depression

## 2024-01-31 ENCOUNTER — TELEPHONE (OUTPATIENT)
Dept: FAMILY MEDICINE CLINIC | Facility: CLINIC | Age: 69
End: 2024-01-31

## 2024-01-31 DIAGNOSIS — E05.00 FLAJANI DISEASE: Primary | ICD-10-CM

## 2024-01-31 NOTE — TELEPHONE ENCOUNTER
Name: Karen Gaston      Relationship: Self      Best Callback Number:   [phone]    HUB PROVIDED THE RELAY MESSAGE FROM THE OFFICE      PATIENT: HAS FURTHER QUESTIONS AND WOULD LIKE A CALL BACK AT THE FOLLOWING PHONE NUMBER     ADDITIONAL INFORMATION: PATIENT STATED OFFICE HAS BLOOD DRAWN FOR YEARS AND FEELS THAT THE REFERRAL SHOULD BE SENT TO DR MORLEY.    PATIENT HAS HAD OTHER TEST RAN SUCH AS CT SCANS ECT.  PATIENT HAS PPO PLAN AND STATED THAT SHE DOES NOT NEED A REFERRAL.    PATIENT STATED SHE WILL BE CALLING DR MORLEY AND IS REQUESTING ANY RECORDS BE SENT TO HER OFFICE.

## 2024-01-31 NOTE — TELEPHONE ENCOUNTER
Caller: Karen Gaston    Relationship: Self    Best call back number: [phone]     What is the medical concern/diagnosis: ARTHRITIS    What specialty or service is being requested: RHEUMATOLOGY     What is the provider, practice or medical service name: [facility]     What is the office location: LOCATED ON [facility]    What is the office phone number: [phone]    Any additional details:

## 2024-01-31 NOTE — TELEPHONE ENCOUNTER
Ok for hub/fd to relay    Lmtcb    Per Dr. Garcia,  I would advise that she wait for her appointment in 3 weeks for the referral.  Rheumatologist generally will not take the patient until they have seen their primary care doctor for the issue and have drawn blood work.

## 2024-01-31 NOTE — TELEPHONE ENCOUNTER
Patient stated that she has already been sent a referral and is wanting the referral sent to  instead

## 2024-02-07 DIAGNOSIS — I10 ESSENTIAL HYPERTENSION: ICD-10-CM

## 2024-02-08 ENCOUNTER — HOSPITAL ENCOUNTER (OUTPATIENT)
Dept: MAMMOGRAPHY | Facility: HOSPITAL | Age: 69
Discharge: HOME | End: 2024-02-08
Admitting: STUDENT IN AN ORGANIZED HEALTH CARE EDUCATION/TRAINING PROGRAM
Payer: MEDICARE

## 2024-02-08 DIAGNOSIS — Z12.31 SCREENING MAMMOGRAM FOR BREAST CANCER: ICD-10-CM

## 2024-02-08 PROCEDURE — 77063 BREAST TOMOSYNTHESIS BI: CPT

## 2024-02-08 PROCEDURE — 77067 SCR MAMMO BI INCL CAD: CPT

## 2024-02-08 RX ORDER — LOSARTAN POTASSIUM 100 MG/1
100 TABLET ORAL DAILY
Qty: 90 TABLET | Refills: 1 | Status: SHIPPED | OUTPATIENT
Start: 2024-02-08

## 2024-02-08 NOTE — TELEPHONE ENCOUNTER
LOV 10/24/23  NOV 2/20/24  Last fill 10/24/23    Please advise    [facility]

## 2024-02-20 ENCOUNTER — OFFICE VISIT (OUTPATIENT)
Dept: FAMILY MEDICINE CLINIC | Facility: CLINIC | Age: 69
End: 2024-02-20
Payer: MEDICARE

## 2024-02-20 VITALS
HEART RATE: 99 BPM | OXYGEN SATURATION: 97 % | DIASTOLIC BLOOD PRESSURE: 88 MMHG | BODY MASS INDEX: 21.47 KG/M2 | TEMPERATURE: 97.2 F | SYSTOLIC BLOOD PRESSURE: 151 MMHG | WEIGHT: 133 LBS

## 2024-02-20 DIAGNOSIS — J30.2 SEASONAL ALLERGIES: ICD-10-CM

## 2024-02-20 DIAGNOSIS — F17.200 SMOKER: ICD-10-CM

## 2024-02-20 DIAGNOSIS — E05.00 FLAJANI DISEASE: ICD-10-CM

## 2024-02-20 DIAGNOSIS — F32.A DEPRESSION, UNSPECIFIED DEPRESSION TYPE: ICD-10-CM

## 2024-02-20 DIAGNOSIS — Z00.00 ENCOUNTER FOR SUBSEQUENT ANNUAL WELLNESS VISIT (AWV) IN MEDICARE PATIENT: Primary | ICD-10-CM

## 2024-02-20 DIAGNOSIS — E78.5 HYPERLIPIDEMIA, UNSPECIFIED HYPERLIPIDEMIA TYPE: ICD-10-CM

## 2024-02-20 DIAGNOSIS — M19.90 ARTHRITIS: ICD-10-CM

## 2024-02-20 DIAGNOSIS — J32.9 SINUSITIS, UNSPECIFIED CHRONICITY, UNSPECIFIED LOCATION: ICD-10-CM

## 2024-02-20 DIAGNOSIS — I10 ESSENTIAL HYPERTENSION: ICD-10-CM

## 2024-02-20 RX ORDER — ESCITALOPRAM OXALATE 10 MG/1
30 TABLET ORAL DAILY
Qty: 270 TABLET | Refills: 2 | Status: SHIPPED | OUTPATIENT
Start: 2024-02-20 | End: 2024-05-20

## 2024-02-20 RX ORDER — AMLODIPINE BESYLATE 5 MG/1
5 TABLET ORAL DAILY
Qty: 90 TABLET | Refills: 3 | Status: SHIPPED | OUTPATIENT
Start: 2024-02-20

## 2024-02-20 RX ORDER — AZITHROMYCIN 250 MG/1
TABLET, FILM COATED ORAL
Qty: 6 TABLET | Refills: 0 | Status: SHIPPED | OUTPATIENT
Start: 2024-02-20

## 2024-02-20 RX ORDER — VARENICLINE TARTRATE 0.5 (11)-1
KIT ORAL
Qty: 1 EACH | Refills: 0 | Status: SHIPPED | OUTPATIENT
Start: 2024-02-20 | End: 2024-03-19

## 2024-02-20 RX ORDER — VARENICLINE TARTRATE 1 MG/1
1 TABLET, FILM COATED ORAL 2 TIMES DAILY
Qty: 56 TABLET | Refills: 1 | Status: SHIPPED | OUTPATIENT
Start: 2024-03-19 | End: 2024-05-14

## 2024-02-20 NOTE — PROGRESS NOTES
The ABCs of the Annual Wellness Visit  Subsequent Medicare Wellness Visit    Subjective    Karen Gaston is a 68 y.o. female who presents for a Subsequent Medicare Wellness Visit.    The following portions of the patient's history were reviewed and   updated as appropriate: allergies, current medications, past family history, past medical history, past social history, past surgical history, and problem list.    Compared to one year ago, the patient feels her physical   health is worse. Has worsening pain, has appt with rheum.     Compared to one year ago, the patient feels her mental   health is worse. Is seeing behavioral health. Has not been caring much about anything. Doesn't want to clean.     Recent Hospitalizations:  She was not admitted to the hospital during the last year.       Current Medical Providers:  Patient Care Team:  Vira Barlow DO as PCP - General (Family Medicine)    Outpatient Medications Prior to Visit   Medication Sig Dispense Refill    ammonium lactate (AMLACTIN) 12 % cream Apply  topically to the appropriate area as directed As Needed for Dry Skin. 500 g 11    atorvastatin (LIPITOR) 40 MG tablet Take 1 tablet by mouth Daily. 30 tablet 3    baclofen (LIORESAL) 10 MG tablet Take 1 tablet by mouth 3 (Three) Times a Day. 90 tablet 3    bumetanide (BUMEX) 2 MG tablet Take 1 tablet by mouth Daily. 30 tablet 11    cetirizine (zyrTEC) 10 MG tablet Take 1 tablet by mouth Daily. 30 tablet 2    fluticasone (FLONASE) 50 MCG/ACT nasal spray 2 sprays into the nostril(s) as directed by provider Daily. 16 g 2    losartan (COZAAR) 100 MG tablet TAKE 1 TABLET BY MOUTH DAILY 90 tablet 1    meloxicam (MOBIC) 15 MG tablet Take 1 tablet by mouth Daily. 30 tablet 3    multivitamin (THERAGRAN) tablet tablet Take  by mouth Daily.      Omega-3 Fatty Acids (fish oil) 1200 MG capsule capsule Take 2 capsules by mouth 2 (Two) Times a Day With Meals. 120 capsule 11    omeprazole (priLOSEC) 40 MG capsule Take  1 capsule by mouth Daily. 30 capsule 10    potassium chloride (KLOR-CON) 20 MEQ CR tablet Take 1 tablet by mouth Daily. 30 tablet 10    vitamin E 100 UNIT capsule Take 1 capsule by mouth Daily.      aspirin 81 MG chewable tablet Chew 1 tablet Daily.      escitalopram (LEXAPRO) 20 MG tablet Take 1 tablet by mouth Daily. 30 tablet 1    zolpidem (AMBIEN) 5 MG tablet Take 1 tablet by mouth At Night As Needed for Sleep. 30 tablet 0     No facility-administered medications prior to visit.       No opioid medication identified on active medication list. I have reviewed chart for other potential  high risk medication/s and harmful drug interactions in the elderly.        Aspirin is on active medication list. Aspirin use is not indicated based on review of current medical condition/s. Risk of harm outweighs potential benefits. Patient instructed to discontinue this medication.  .      Patient Active Problem List   Diagnosis    Visit for screening mammogram    Blunt trauma to chest    Bulge of lumbar disc without myelopathy    Disorder of thyroid gland    Cervical osteoarthritis    Chest pain    Contusion of rib    Depressive disorder    Esophageal reflux    Fall    Fatigue    Flajani disease    Gonalgia    Hyperlipidemia    Essential hypertension    Hypokalemia    Influenza vaccination declined    Injury of head    Insomnia    Lesion of conjunctiva    Lumbar radiculopathy    Lump of breast    Rib pain    Seasonal allergies    High risk medication use    Immunization due    Allergic rhinitis due to pollen    Postmenopausal    Lumbar pain    Hip pain, chronic, right    Arthritis    Compression fracture of T12 vertebra with delayed healing    Neck pain     Advance Care Planning   Advance Care Planning     Advance Directive is not on file.  ACP discussion was held with the patient during this visit. Patient does not have an advance directive, information provided.     Objective    Vitals:    02/20/24 0856   BP: 151/88   BP  "Location: Left arm   Patient Position: Sitting   Cuff Size: Adult   Pulse: 99   Temp: 97.2 °F (36.2 °C)   SpO2: 97%   Weight: 60.3 kg (133 lb)     Estimated body mass index is 21.47 kg/m² as calculated from the following:    Height as of 12/6/22: 167.6 cm (66\").    Weight as of this encounter: 60.3 kg (133 lb).    BMI is within normal parameters. No other follow-up for BMI required.      Does the patient have evidence of cognitive impairment?  Feels she has some issues related to memory and age and thyroid disease           HEALTH RISK ASSESSMENT    Smoking Status:  Social History     Tobacco Use   Smoking Status Light Smoker    Packs/day: 0.25    Years: 0.50    Additional pack years: 0.00    Total pack years: 0.13    Types: Cigarettes    Last attempt to quit: 12/16/2020    Years since quitting: 3.1   Smokeless Tobacco Never     Alcohol Consumption:  Social History     Substance and Sexual Activity   Alcohol Use Yes     Fall Risk Screen:    WANDA Fall Risk Assessment has not been completed.    Depression Screening:      10/24/2023     1:13 PM   PHQ-2/PHQ-9 Depression Screening   Little Interest or Pleasure in Doing Things 0-->not at all   Feeling Down, Depressed or Hopeless 0-->not at all   PHQ-9: Brief Depression Severity Measure Score 0       Health Habits and Functional and Cognitive Screening:      10/4/2022     9:00 AM   Functional & Cognitive Status   Do you have difficulty preparing food and eating? No   Do you have difficulty bathing yourself, getting dressed or grooming yourself? No   Do you have difficulty using the toilet? No   Do you have difficulty moving around from place to place? No   Do you have trouble with steps or getting out of a bed or a chair? No   Current Diet Well Balanced Diet   Dental Exam Up to date   Eye Exam Up to date   Exercise (times per week) 5 times per week   Current Exercises Include Walking   Do you need help using the phone?  No   Are you deaf or do you have serious difficulty " hearing?  No   Do you need help to go to places out of walking distance? No   Do you need help shopping? No   Do you need help preparing meals?  No   Do you need help with housework?  No   Do you need help with laundry? No   Do you need help taking your medications? No   Do you need help managing money? No   Do you ever drive or ride in a car without wearing a seat belt? No   Have you felt unusual stress, anger or loneliness in the last month? Yes   Who do you live with? Alone   If you need help, do you have trouble finding someone available to you? No   Do you have difficulty concentrating, remembering or making decisions? No       Age-appropriate Screening Schedule:  Refer to the list below for future screening recommendations based on patient's age, sex and/or medical conditions. Orders for these recommended tests are listed in the plan section. The patient has been provided with a written plan.    Health Maintenance   Topic Date Due    DXA SCAN  Never done    RSV Vaccine - Adults (1 - 1-dose 60+ series) Never done    LIPID PANEL  02/24/2023    COVID-19 Vaccine (5 - 2023-24 season) 09/01/2023    COLORECTAL CANCER SCREENING  07/18/2024    ANNUAL WELLNESS VISIT  02/20/2025    MAMMOGRAM  02/08/2026    TDAP/TD VACCINES (2 - Td or Tdap) 05/28/2032    HEPATITIS C SCREENING  Completed    INFLUENZA VACCINE  Completed    Pneumococcal Vaccine 65+  Completed    PAP SMEAR  Discontinued    ZOSTER VACCINE  Discontinued                  CMS Preventative Services Quick Reference  Risk Factors Identified During Encounter  Depression/Dysphoria: Current medication adjusted.  Follow up visit planned.  The above risks/problems have been discussed with the patient.  Pertinent information has been shared with the patient in the After Visit Summary.  An After Visit Summary and PPPS were made available to the patient.    Follow Up:   Next Medicare Wellness visit to be scheduled in 1 year.       Additional E&M Note during same encounter  follows:  Patient has multiple medical problems which are significant and separately identifiable that require additional work above and beyond the Medicare Wellness Visit.      Chief Complaint  Hypertension    Subjective        HPI  Karen Gaston is also being seen today for some concerns.   Believes she has a sinus infection.  Has rheumatology visit scheduled but sometimes pain has been so bad. Her meloxicam doesn't help sometimes. Her tylenol arthritis needs some additional help. Has her rheumatology visit scheduled for next month. Appt will be with Dr. Howard. Has been using occasional marijauna to help with her pain.   Has been taking lexapro. Has been doing teletherapy.   Took her losartan today and drank 3 cups of coffee.        Objective   Vital Signs:  /88 (BP Location: Left arm, Patient Position: Sitting, Cuff Size: Adult)   Pulse 99   Temp 97.2 °F (36.2 °C)   Wt 60.3 kg (133 lb)   SpO2 97%   BMI 21.47 kg/m²     Physical Exam  Vitals and nursing note reviewed.   Constitutional:       General: She is not in acute distress.     Appearance: Normal appearance. She is not ill-appearing.   HENT:      Head: Normocephalic and atraumatic.      Nose: Nose normal.      Mouth/Throat:      Mouth: Mucous membranes are moist.   Eyes:      Extraocular Movements: Extraocular movements intact.      Conjunctiva/sclera: Conjunctivae normal.   Cardiovascular:      Rate and Rhythm: Normal rate and regular rhythm.      Heart sounds: Normal heart sounds. No murmur heard.     No gallop.   Pulmonary:      Effort: Pulmonary effort is normal. No respiratory distress.      Breath sounds: Normal breath sounds. No stridor. No wheezing, rhonchi or rales.   Chest:      Chest wall: No tenderness.   Skin:     General: Skin is warm and dry.   Neurological:      General: No focal deficit present.      Mental Status: She is alert and oriented to person, place, and time. Mental status is at baseline.   Psychiatric:         Mood  and Affect: Mood normal.         Behavior: Behavior normal.                         Assessment and Plan   Diagnoses and all orders for this visit:    1. Encounter for subsequent annual wellness visit (AWV) in Medicare patient (Primary)    2. Seasonal allergies    3. Arthritis    4. Flajani disease  -     Ambulatory Referral to Endocrinology  -     TSH Rfx On Abnormal To Free T4    5. Depression, unspecified depression type  -     escitalopram (LEXAPRO) 10 MG tablet; Take 3 tablets by mouth Daily for 90 days.  Dispense: 270 tablet; Refill: 2    6. Hyperlipidemia, unspecified hyperlipidemia type  -     Lipid panel    7. Essential hypertension  -     Comprehensive metabolic panel  -     amLODIPine (NORVASC) 5 MG tablet; Take 1 tablet by mouth Daily.  Dispense: 90 tablet; Refill: 3    8. Sinusitis, unspecified chronicity, unspecified location  -     azithromycin (Zithromax Z-Robles) 250 MG tablet; Take 2 tablets by mouth on day 1, then 1 tablet daily on days 2-5  Dispense: 6 tablet; Refill: 0    9. Smoker  -     Varenicline Tartrate, Starter, 0.5 MG X 11 & 1 MG X 42 tablet therapy pack; Take 0.5 mg by mouth Daily for 3 days, THEN 0.5 mg 2 (Two) Times a Day for 4 days, THEN 1 mg 2 (Two) Times a Day for 21 days. Take 0.5 mg po daily x 3 days, then 0.5 mg po bid x 4 days, then 1 mg po bid  Dispense: 1 each; Refill: 0  -     varenicline (Chantix Continuing Month Robles) 1 MG tablet; Take 1 tablet by mouth 2 (Two) Times a Day for 56 days.  Dispense: 56 tablet; Refill: 1      Increase lexapro to 30 mg.   Add amlodipine 5 mg tablet.   Stop Ambien.   Letter signed for emotional support animal.   Treat sinusitis with z pack.  Restart chantix.        Follow Up   Return in about 3 months (around 5/20/2024) for Chronic care.  Patient was given instructions and counseling regarding her condition or for health maintenance advice. Please see specific information pulled into the AVS if appropriate.

## 2024-02-21 LAB
ALBUMIN SERPL-MCNC: 4.3 G/DL (ref 3.5–5.2)
ALBUMIN/GLOB SERPL: 2 G/DL
ALP SERPL-CCNC: 65 U/L (ref 39–117)
ALT SERPL-CCNC: 43 U/L (ref 1–33)
AST SERPL-CCNC: 42 U/L (ref 1–32)
BILIRUB SERPL-MCNC: 0.5 MG/DL (ref 0–1.2)
BUN SERPL-MCNC: 11 MG/DL (ref 8–23)
BUN/CREAT SERPL: 15.9 (ref 7–25)
CALCIUM SERPL-MCNC: 8.9 MG/DL (ref 8.6–10.5)
CHLORIDE SERPL-SCNC: 102 MMOL/L (ref 98–107)
CHOLEST SERPL-MCNC: 202 MG/DL (ref 0–200)
CO2 SERPL-SCNC: 24.4 MMOL/L (ref 22–29)
CREAT SERPL-MCNC: 0.69 MG/DL (ref 0.57–1)
EGFRCR SERPLBLD CKD-EPI 2021: 95.9 ML/MIN/1.73
GLOBULIN SER CALC-MCNC: 2.1 GM/DL
GLUCOSE SERPL-MCNC: 105 MG/DL (ref 65–99)
HDLC SERPL-MCNC: 89 MG/DL (ref 40–60)
LDLC SERPL CALC-MCNC: 98 MG/DL (ref 0–100)
POTASSIUM SERPL-SCNC: 4 MMOL/L (ref 3.5–5.2)
PROT SERPL-MCNC: 6.4 G/DL (ref 6–8.5)
SODIUM SERPL-SCNC: 140 MMOL/L (ref 136–145)
T4 FREE SERPL-MCNC: 1.33 NG/DL (ref 0.93–1.7)
TRIGL SERPL-MCNC: 83 MG/DL (ref 0–150)
TSH SERPL DL<=0.005 MIU/L-ACNC: 0.18 UIU/ML (ref 0.27–4.2)
VLDLC SERPL CALC-MCNC: 15 MG/DL (ref 5–40)

## 2024-02-26 DIAGNOSIS — J30.1 ALLERGIC RHINITIS DUE TO POLLEN, UNSPECIFIED SEASONALITY: ICD-10-CM

## 2024-02-26 RX ORDER — CETIRIZINE HYDROCHLORIDE 10 MG/1
10 TABLET ORAL DAILY
Qty: 90 TABLET | Refills: 3 | Status: SHIPPED | OUTPATIENT
Start: 2024-02-26

## 2024-02-28 DIAGNOSIS — E78.5 HYPERLIPIDEMIA, UNSPECIFIED HYPERLIPIDEMIA TYPE: ICD-10-CM

## 2024-02-28 RX ORDER — ATORVASTATIN CALCIUM 40 MG/1
40 TABLET, FILM COATED ORAL DAILY
Qty: 90 TABLET | Refills: 0 | Status: SHIPPED | OUTPATIENT
Start: 2024-02-28

## 2024-03-25 ENCOUNTER — TELEMEDICINE (OUTPATIENT)
Dept: PSYCHIATRY | Facility: CLINIC | Age: 69
End: 2024-03-25
Payer: MEDICARE

## 2024-03-25 ENCOUNTER — TELEPHONE (OUTPATIENT)
Dept: FAMILY MEDICINE CLINIC | Facility: CLINIC | Age: 69
End: 2024-03-25

## 2024-03-25 NOTE — TELEPHONE ENCOUNTER
Caller: Karen Gaston    Relationship: Self    Best call back number: [phone]     What is the medical concern/diagnosis: ANXIETY/ FAMILY HISTORY     What specialty or service is being requested: BEHAVORIAL HEALTH    What is the provider, practice or medical service name: [facility]    What is the office location: UNKNOWN    What is the office phone number: UNKNOWN    Any additional details: PATIENT IS ASKING FOR A NEW REFERRAL TO [facility] AND IS NEEDING THE APPOINTMENT BEFORE FRIDAY DUE TO HER CO-PAY WITH INSURANCE.

## 2024-05-15 DIAGNOSIS — K21.9 GASTROESOPHAGEAL REFLUX DISEASE WITHOUT ESOPHAGITIS: ICD-10-CM

## 2024-05-15 RX ORDER — OMEPRAZOLE 40 MG/1
40 CAPSULE, DELAYED RELEASE ORAL DAILY
Qty: 30 CAPSULE | Refills: 10 | Status: SHIPPED | OUTPATIENT
Start: 2024-05-15

## 2024-05-29 DIAGNOSIS — G47.00 INSOMNIA, UNSPECIFIED TYPE: ICD-10-CM

## 2024-05-29 RX ORDER — ZOLPIDEM TARTRATE 5 MG/1
TABLET ORAL
Qty: 30 TABLET | OUTPATIENT
Start: 2024-05-29

## 2024-06-02 DIAGNOSIS — E78.5 HYPERLIPIDEMIA, UNSPECIFIED HYPERLIPIDEMIA TYPE: ICD-10-CM

## 2024-06-02 DIAGNOSIS — J30.1 ALLERGIC RHINITIS DUE TO POLLEN, UNSPECIFIED SEASONALITY: ICD-10-CM

## 2024-06-03 RX ORDER — CETIRIZINE HYDROCHLORIDE 10 MG/1
10 TABLET ORAL DAILY
Qty: 90 TABLET | Refills: 3 | Status: SHIPPED | OUTPATIENT
Start: 2024-06-03

## 2024-06-03 RX ORDER — ATORVASTATIN CALCIUM 40 MG/1
40 TABLET, FILM COATED ORAL DAILY
Qty: 90 TABLET | Refills: 0 | Status: SHIPPED | OUTPATIENT
Start: 2024-06-03

## 2024-06-03 NOTE — TELEPHONE ENCOUNTER
Rx Refill Note  Requested Prescriptions     Pending Prescriptions Disp Refills    cetirizine (zyrTEC) 10 MG tablet [Pharmacy Med Name: CETIRIZINE HCL 10 MG TABLET] 90 tablet 3     Sig: TAKE 1 TABLET BY MOUTH DAILY    atorvastatin (LIPITOR) 40 MG tablet [Pharmacy Med Name: ATORVASTATIN 40 MG TABLET] 90 tablet 0     Sig: TAKE 1 TABLET BY MOUTH DAILY      Last office visit with prescribing clinician: 2/20/2024   Last telemedicine visit with prescribing clinician: Visit date not found   Next office visit with prescribing clinician: Visit date not found                         Would you like a call back once the refill request has been completed: [] Yes [] No    If the office needs to give you a call back, can they leave a voicemail: [] Yes [] No    Gwen Whiteside MA  06/03/24, 10:25 EDT

## 2024-06-07 ENCOUNTER — OFFICE VISIT (OUTPATIENT)
Dept: ENDOCRINOLOGY | Age: 69
End: 2024-06-07
Payer: MEDICARE

## 2024-06-07 VITALS
DIASTOLIC BLOOD PRESSURE: 84 MMHG | WEIGHT: 123 LBS | HEART RATE: 78 BPM | OXYGEN SATURATION: 98 % | BODY MASS INDEX: 19.77 KG/M2 | HEIGHT: 66 IN | SYSTOLIC BLOOD PRESSURE: 128 MMHG

## 2024-06-07 DIAGNOSIS — H53.2 DOUBLE VISION: ICD-10-CM

## 2024-06-07 DIAGNOSIS — E05.00 GRAVES' DISEASE: Primary | ICD-10-CM

## 2024-06-07 NOTE — ASSESSMENT & PLAN NOTE
States has been off methimazole for more than 10 years  At some point tried Synthroid  Recheck TFT  Check TSI, thyroid receptor antibody TPO and thyroglobulin antibody  Further testing and management based off results

## 2024-06-07 NOTE — PROGRESS NOTES
"Chief Complaint  Flajani disease    Subjective        Karen Gaston presents to [facility] ENDOCRINOLOGY establish care.    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 The gland may be mildly enlarged.       Complains of palpitations and weight loss  She is a current smoker          Objective   Vital Signs:  /84 (BP Location: Right arm, Patient Position: Sitting, Cuff Size: Adult)   Pulse 78   Ht 167.6 cm (65.98\")   Wt 55.8 kg (123 lb)   SpO2 98%   BMI 19.86 kg/m²   Estimated body mass index is 19.86 kg/m² as calculated from the following:    Height as of this encounter: 167.6 cm (65.98\").    Weight as of this encounter: 55.8 kg (123 lb).       BMI is within normal parameters. No other follow-up for BMI required.      Review of Systems   Constitutional:  Positive for appetite change (increased) and unexpected weight change (lost 15 lb in 1 year).   Cardiovascular:  Positive for palpitations.   Endocrine: Positive for cold intolerance. Negative for heat intolerance.        Physical Exam  Constitutional:       Appearance: Normal appearance.   HENT:      Head: Normocephalic and atraumatic.   Eyes:      Extraocular Movements: Extraocular movements intact.   Cardiovascular:      Rate and Rhythm: Normal rate and regular rhythm.   Pulmonary:      Effort: Pulmonary effort is normal.      Breath sounds: Normal breath sounds. No wheezing.   Abdominal:      Palpations: Abdomen is soft.      Tenderness: There is no abdominal tenderness.   Musculoskeletal:         General: No swelling. Normal range of motion.      Cervical back: " Neck supple. No tenderness.   Neurological:      Mental Status: She is alert and oriented to person, place, and time.      Comments: Tremor     Psychiatric:         Mood and Affect: Mood normal.        Result Review :  The following data was reviewed by: Mahrokh Nokhbehzaeim, MD on 06/07/2024:  TSH          2/20/2024    09:42   TSH   TSH 0.178      CMP          2/20/2024    09:42   CMP   Glucose 105    BUN 11    Creatinine 0.69    Sodium 140    Potassium 4.0    Chloride 102    Calcium 8.9    Total Protein 6.4    Albumin 4.3    Globulin 2.1    Total Bilirubin 0.5    Alkaline Phosphatase 65    AST (SGOT) 42    ALT (SGPT) 43    BUN/Creatinine Ratio 15.9          TSH          2/20/2024    09:42   TSH   TSH 0.178       Free T4   Date Value Ref Range Status   02/20/2024 1.33 0.93 - 1.70 ng/dL Final     Comment:     Results may be falsely increased if patient taking Biotin.      Data reviewed : Endocrinology clinic note             Assessment and Plan   Diagnoses and all orders for this visit:    1. Graves' disease (Primary)  Assessment & Plan:  States has been off methimazole for more than 10 years  At some point tried Synthroid  Recheck TFT  Check TSI, thyroid receptor antibody TPO and thyroglobulin antibody  Further testing and management based off results    Orders:  -     Thyrotropin Receptor Antibody  -     Thyroid Peroxidase Antibody  -     Thyroglobulin Antibody  -     T4, Free  -     TSH  -     Thyroid Stimulating Immunoglobulin  -     CBC & Differential  -     Comprehensive Metabolic Panel  -     US Thyroid  -     Ambulatory Referral to Ophthalmology  -     T3, Free    2. Double vision  Assessment & Plan:  Would like to be referred to see a new ophthalmologist.  Referral placed    Orders:  -     Ambulatory Referral to Ophthalmology      Daily smoker and not willing to quit.  I explained to her that smoking might affect her thyroid eye disease.       Follow Up   Return in about 6 months (around  12/7/2024).  Patient was given instructions and counseling regarding her condition or for health maintenance advice. Please see specific information pulled into the AVS if appropriate.

## 2024-06-10 LAB
ALBUMIN SERPL-MCNC: 4.6 G/DL (ref 3.9–4.9)
ALBUMIN/GLOB SERPL: 1.8 {RATIO} (ref 1.2–2.2)
ALP SERPL-CCNC: 72 IU/L (ref 44–121)
ALT SERPL-CCNC: 24 IU/L (ref 0–32)
AST SERPL-CCNC: 28 IU/L (ref 0–40)
BASOPHILS # BLD AUTO: 0 X10E3/UL (ref 0–0.2)
BASOPHILS NFR BLD AUTO: 1 %
BILIRUB SERPL-MCNC: 0.5 MG/DL (ref 0–1.2)
BUN SERPL-MCNC: 14 MG/DL (ref 8–27)
BUN/CREAT SERPL: 21 (ref 12–28)
CALCIUM SERPL-MCNC: 9.8 MG/DL (ref 8.7–10.3)
CHLORIDE SERPL-SCNC: 97 MMOL/L (ref 96–106)
CO2 SERPL-SCNC: 26 MMOL/L (ref 20–29)
CREAT SERPL-MCNC: 0.67 MG/DL (ref 0.57–1)
EGFRCR SERPLBLD CKD-EPI 2021: 96 ML/MIN/1.73
EOSINOPHIL # BLD AUTO: 0.1 X10E3/UL (ref 0–0.4)
EOSINOPHIL NFR BLD AUTO: 1 %
ERYTHROCYTE [DISTWIDTH] IN BLOOD BY AUTOMATED COUNT: 12 % (ref 11.7–15.4)
GLOBULIN SER CALC-MCNC: 2.5 G/DL (ref 1.5–4.5)
GLUCOSE SERPL-MCNC: 92 MG/DL (ref 70–99)
HCT VFR BLD AUTO: 38.1 % (ref 34–46.6)
HGB BLD-MCNC: 12.5 G/DL (ref 11.1–15.9)
IMM GRANULOCYTES # BLD AUTO: 0 X10E3/UL (ref 0–0.1)
IMM GRANULOCYTES NFR BLD AUTO: 0 %
LYMPHOCYTES # BLD AUTO: 1.5 X10E3/UL (ref 0.7–3.1)
LYMPHOCYTES NFR BLD AUTO: 23 %
MCH RBC QN AUTO: 30 PG (ref 26.6–33)
MCHC RBC AUTO-ENTMCNC: 32.8 G/DL (ref 31.5–35.7)
MCV RBC AUTO: 92 FL (ref 79–97)
MONOCYTES # BLD AUTO: 0.7 X10E3/UL (ref 0.1–0.9)
MONOCYTES NFR BLD AUTO: 11 %
NEUTROPHILS # BLD AUTO: 4.2 X10E3/UL (ref 1.4–7)
NEUTROPHILS NFR BLD AUTO: 64 %
PLATELET # BLD AUTO: 322 X10E3/UL (ref 150–450)
POTASSIUM SERPL-SCNC: 3.6 MMOL/L (ref 3.5–5.2)
PROT SERPL-MCNC: 7.1 G/DL (ref 6–8.5)
RBC # BLD AUTO: 4.16 X10E6/UL (ref 3.77–5.28)
SODIUM SERPL-SCNC: 140 MMOL/L (ref 134–144)
SPECIMEN STATUS: NORMAL
T3FREE SERPL-MCNC: 3.4 PG/ML (ref 2–4.4)
T4 FREE SERPL-MCNC: 1.2 NG/DL (ref 0.82–1.77)
THYROGLOB AB SERPL-ACNC: <1 IU/ML (ref 0–0.9)
THYROPEROXIDASE AB SERPL-ACNC: <9 IU/ML (ref 0–34)
TSH RECEP AB SER-ACNC: NORMAL IU/L
TSH SERPL DL<=0.005 MIU/L-ACNC: 0.89 UIU/ML (ref 0.45–4.5)
TSI SER-ACNC: 1.08 IU/L (ref 0–0.55)
WBC # BLD AUTO: 6.6 X10E3/UL (ref 3.4–10.8)

## 2024-06-13 ENCOUNTER — HOSPITAL ENCOUNTER (OUTPATIENT)
Facility: HOSPITAL | Age: 69
Discharge: HOME | End: 2024-06-13
Admitting: STUDENT IN AN ORGANIZED HEALTH CARE EDUCATION/TRAINING PROGRAM
Payer: MEDICARE

## 2024-06-13 PROCEDURE — 76536 US EXAM OF HEAD AND NECK: CPT

## 2024-06-27 ENCOUNTER — OFFICE VISIT (OUTPATIENT)
Dept: FAMILY MEDICINE CLINIC | Facility: CLINIC | Age: 69
End: 2024-06-27
Payer: MEDICARE

## 2024-06-27 VITALS
SYSTOLIC BLOOD PRESSURE: 136 MMHG | DIASTOLIC BLOOD PRESSURE: 66 MMHG | HEART RATE: 83 BPM | WEIGHT: 130.2 LBS | BODY MASS INDEX: 20.93 KG/M2 | HEIGHT: 66 IN | OXYGEN SATURATION: 100 %

## 2024-06-27 DIAGNOSIS — R21 RASH: ICD-10-CM

## 2024-06-27 DIAGNOSIS — R60.9 EDEMA, UNSPECIFIED TYPE: ICD-10-CM

## 2024-06-27 DIAGNOSIS — M54.50 LUMBAR PAIN: ICD-10-CM

## 2024-06-27 DIAGNOSIS — K21.9 GASTROESOPHAGEAL REFLUX DISEASE WITHOUT ESOPHAGITIS: ICD-10-CM

## 2024-06-27 DIAGNOSIS — F41.9 ANXIETY: Primary | ICD-10-CM

## 2024-06-27 DIAGNOSIS — J30.1 ALLERGIC RHINITIS DUE TO POLLEN, UNSPECIFIED SEASONALITY: ICD-10-CM

## 2024-06-27 DIAGNOSIS — E78.5 HYPERLIPIDEMIA, UNSPECIFIED HYPERLIPIDEMIA TYPE: ICD-10-CM

## 2024-06-27 DIAGNOSIS — I10 ESSENTIAL HYPERTENSION: ICD-10-CM

## 2024-06-27 DIAGNOSIS — M19.90 ARTHRITIS: ICD-10-CM

## 2024-06-27 RX ORDER — FLUTICASONE PROPIONATE 50 MCG
2 SPRAY, SUSPENSION (ML) NASAL DAILY
Qty: 16 G | Refills: 2 | Status: SHIPPED | OUTPATIENT
Start: 2024-06-27

## 2024-06-27 RX ORDER — BACLOFEN 10 MG/1
10 TABLET ORAL 2 TIMES DAILY
Qty: 180 TABLET | Refills: 0 | Status: SHIPPED | OUTPATIENT
Start: 2024-06-27 | End: 2024-09-25

## 2024-06-27 RX ORDER — CETIRIZINE HYDROCHLORIDE 10 MG/1
10 TABLET ORAL DAILY
Qty: 90 TABLET | Refills: 3 | Status: SHIPPED | OUTPATIENT
Start: 2024-06-27

## 2024-06-27 RX ORDER — AMMONIUM LACTATE 12 G/100G
LOTION TOPICAL AS NEEDED
Qty: 396 G | Refills: 3 | Status: SHIPPED | OUTPATIENT
Start: 2024-06-27

## 2024-06-27 RX ORDER — AMLODIPINE BESYLATE 5 MG/1
5 TABLET ORAL DAILY
Qty: 90 TABLET | Refills: 3 | Status: SHIPPED | OUTPATIENT
Start: 2024-06-27

## 2024-06-27 RX ORDER — POTASSIUM CHLORIDE 20 MEQ/1
20 TABLET, EXTENDED RELEASE ORAL DAILY
Qty: 30 TABLET | Refills: 10 | Status: SHIPPED | OUTPATIENT
Start: 2024-06-27

## 2024-06-27 RX ORDER — LOSARTAN POTASSIUM 100 MG/1
100 TABLET ORAL DAILY
Qty: 90 TABLET | Refills: 1 | Status: SHIPPED | OUTPATIENT
Start: 2024-06-27

## 2024-06-27 RX ORDER — ATORVASTATIN CALCIUM 40 MG/1
40 TABLET, FILM COATED ORAL DAILY
Qty: 90 TABLET | Refills: 0 | Status: SHIPPED | OUTPATIENT
Start: 2024-06-27

## 2024-06-27 RX ORDER — BUMETANIDE 2 MG/1
2 TABLET ORAL DAILY
Qty: 30 TABLET | Refills: 11 | Status: SHIPPED | OUTPATIENT
Start: 2024-06-27

## 2024-06-27 RX ORDER — AMMONIUM LACTATE 120 MG/G
CREAM TOPICAL AS NEEDED
Qty: 500 G | Refills: 11 | Status: SHIPPED | OUTPATIENT
Start: 2024-06-27

## 2024-06-27 RX ORDER — MELOXICAM 15 MG/1
15 TABLET ORAL DAILY
Qty: 30 TABLET | Refills: 3 | Status: SHIPPED | OUTPATIENT
Start: 2024-06-27

## 2024-06-27 RX ORDER — OMEPRAZOLE 40 MG/1
40 CAPSULE, DELAYED RELEASE ORAL DAILY
Qty: 30 CAPSULE | Refills: 10 | Status: SHIPPED | OUTPATIENT
Start: 2024-06-27

## 2024-06-27 RX ORDER — ESCITALOPRAM OXALATE 20 MG/1
20 TABLET ORAL DAILY
Qty: 90 TABLET | Refills: 3 | Status: SHIPPED | OUTPATIENT
Start: 2024-06-27 | End: 2025-06-22

## 2024-06-27 NOTE — PROGRESS NOTES
"Chief Complaint  anxiety, htn follow up    Subjective          History of Present Illness    Endocrinologist  Every 6 months    Refills needed today on all medications.    Anxiety   Chronic, ongoing, well controlled.  Last visit PCP  Increased lexapro to 30 mg.   Lexapro 30mg made her \"feel too happy and care free.\"  Went back to lexapro 20mg.    HTN  Chronic, ongoing, well controlled.  Current medication regimen norvasc, bumex, losartan.  BP Readings from Last 3 Encounters:   06/27/24 136/66   06/07/24 128/84   04/18/24 129/82            Objective   Vital Signs:  /66 (BP Location: Left arm, Patient Position: Sitting, Cuff Size: Adult)   Pulse 83   Ht 167.6 cm (65.98\")   Wt 59.1 kg (130 lb 3.2 oz)   SpO2 100%   BMI 21.03 kg/m²   Estimated body mass index is 21.03 kg/m² as calculated from the following:    Height as of this encounter: 167.6 cm (65.98\").    Weight as of this encounter: 59.1 kg (130 lb 3.2 oz).       BMI is within normal parameters. No other follow-up for BMI required.      Physical Exam  Vitals reviewed.   Constitutional:       General: She is not in acute distress.     Appearance: Normal appearance.   HENT:      Head: Normocephalic and atraumatic.   Eyes:      Conjunctiva/sclera: Conjunctivae normal.      Pupils: Pupils are equal, round, and reactive to light.   Cardiovascular:      Rate and Rhythm: Normal rate and regular rhythm.      Pulses: Normal pulses.      Heart sounds: No murmur heard.     No gallop.   Pulmonary:      Effort: Pulmonary effort is normal. No respiratory distress.      Breath sounds: Normal breath sounds. No wheezing.   Abdominal:      General: Bowel sounds are normal. There is no distension.      Palpations: Abdomen is soft.      Tenderness: There is no abdominal tenderness.   Musculoskeletal:         General: Normal range of motion.      Cervical back: Normal range of motion and neck supple. No tenderness.   Skin:     General: Skin is warm and dry.   Neurological: "      Mental Status: She is alert and oriented to person, place, and time. Mental status is at baseline.   Psychiatric:         Mood and Affect: Mood normal.        Result Review :                     Assessment and Plan     Diagnoses and all orders for this visit:    1. Anxiety (Primary)  -     escitalopram (Lexapro) 20 MG tablet; Take 1 tablet by mouth Daily for 360 days.  Dispense: 90 tablet; Refill: 3    2. Essential hypertension  -     amLODIPine (NORVASC) 5 MG tablet; Take 1 tablet by mouth Daily.  Dispense: 90 tablet; Refill: 3  -     losartan (COZAAR) 100 MG tablet; Take 1 tablet by mouth Daily.  Dispense: 90 tablet; Refill: 1  -     potassium chloride (Klor-Con M20) 20 MEQ CR tablet; Take 1 tablet by mouth Daily.  Dispense: 30 tablet; Refill: 10    3. Rash  -     ammonium lactate (AMLACTIN) 12 % cream; Apply  topically to the appropriate area as directed As Needed for Dry Skin.  Dispense: 500 g; Refill: 11  -     ammonium lactate (LAC-HYDRIN) 12 % lotion; Apply  topically to the appropriate area as directed As Needed for Dry Skin.  Dispense: 396 g; Refill: 3    4. Hyperlipidemia, unspecified hyperlipidemia type  -     atorvastatin (LIPITOR) 40 MG tablet; Take 1 tablet by mouth Daily.  Dispense: 90 tablet; Refill: 0    5. Lumbar pain  -     baclofen (LIORESAL) 10 MG tablet; Take 1 tablet by mouth 2 (Two) Times a Day for 90 days.  Dispense: 180 tablet; Refill: 0    6. Edema, unspecified type  -     bumetanide (BUMEX) 2 MG tablet; Take 1 tablet by mouth Daily.  Dispense: 30 tablet; Refill: 11    7. Allergic rhinitis due to pollen, unspecified seasonality  -     cetirizine (zyrTEC) 10 MG tablet; Take 1 tablet by mouth Daily.  Dispense: 90 tablet; Refill: 3  -     fluticasone (FLONASE) 50 MCG/ACT nasal spray; 2 sprays into the nostril(s) as directed by provider Daily.  Dispense: 16 g; Refill: 2    8. Arthritis  -     meloxicam (MOBIC) 15 MG tablet; Take 1 tablet by mouth Daily.  Dispense: 30 tablet; Refill:  3    9. Gastroesophageal reflux disease without esophagitis  -     omeprazole (priLOSEC) 40 MG capsule; Take 1 capsule by mouth Daily.  Dispense: 30 capsule; Refill: 10      Follow up with PCP in 6 months for chronic care mgmt.       Follow Up     Return in about 6 months (around 12/27/2024).  Patient was given instructions and counseling regarding her condition or for health maintenance advice. Please see specific information pulled into the AVS if appropriate.

## 2024-07-16 DIAGNOSIS — R21 RASH: ICD-10-CM

## 2024-07-16 RX ORDER — AMMONIUM LACTATE 120 MG/G
CREAM TOPICAL AS NEEDED
Qty: 500 G | Refills: 11 | Status: SHIPPED | OUTPATIENT
Start: 2024-07-16 | End: 2024-07-18 | Stop reason: SDUPTHER

## 2024-07-16 NOTE — TELEPHONE ENCOUNTER
Caller: Karen Gaston    Relationship: Self    Best call back number: [phone]    Requested Prescriptions:   Requested Prescriptions     Pending Prescriptions Disp Refills    ammonium lactate (AMLACTIN) 12 % cream 500 g 11     Sig: Apply  topically to the appropriate area as directed As Needed for Dry Skin.        Pharmacy where request should be sent: [facility] [id number] [place] [address]  - [phone] [facility] [phone] FX     Last office visit with prescribing clinician: 2/20/2024   Last telemedicine visit with prescribing clinician: Visit date not found   Next office visit with prescribing clinician: 2/21/2025     Additional details provided by patient: OUT OF MEDICATION. REQUESTED 6-27-24 BUT PRESCRIPTION DID NOT GO THROUGH TO PHARMACY     Does the patient have less than a 3 day supply:  [x] Yes  [] No    Estefanía Sr Rep   07/16/24 09:51 EDT

## 2024-07-17 DIAGNOSIS — R21 RASH: ICD-10-CM

## 2024-07-17 RX ORDER — AMMONIUM LACTATE 120 MG/G
CREAM TOPICAL AS NEEDED
Qty: 500 G | Refills: 11 | Status: CANCELLED | OUTPATIENT
Start: 2024-07-17

## 2024-07-17 NOTE — TELEPHONE ENCOUNTER
When I called pt. About picking up script she asked about trying to send it to a different pharmacy

## 2024-07-18 ENCOUNTER — TELEPHONE (OUTPATIENT)
Dept: FAMILY MEDICINE CLINIC | Facility: CLINIC | Age: 69
End: 2024-07-18
Payer: MEDICARE

## 2024-07-18 DIAGNOSIS — R21 RASH: ICD-10-CM

## 2024-07-18 RX ORDER — AMMONIUM LACTATE 120 MG/G
CREAM TOPICAL AS NEEDED
Qty: 500 G | Refills: 11 | Status: SHIPPED | OUTPATIENT
Start: 2024-07-18

## 2024-07-18 NOTE — TELEPHONE ENCOUNTER
LVM. Tried calling pt,. To let them know that she will have to come by the office to  the script for ammonium.  This will be at the

## 2024-07-19 ENCOUNTER — HOSPITAL ENCOUNTER (OUTPATIENT)
Dept: BONE DENSITY | Facility: HOSPITAL | Age: 69
Discharge: HOME | End: 2024-07-19
Payer: MEDICARE

## 2024-07-19 DIAGNOSIS — Z13.820 SCREENING FOR OSTEOPOROSIS: ICD-10-CM

## 2024-07-19 PROCEDURE — 77080 DXA BONE DENSITY AXIAL: CPT

## 2024-08-24 DIAGNOSIS — I10 ESSENTIAL HYPERTENSION: ICD-10-CM

## 2024-08-26 DIAGNOSIS — E78.5 HYPERLIPIDEMIA, UNSPECIFIED HYPERLIPIDEMIA TYPE: ICD-10-CM

## 2024-08-26 RX ORDER — ATORVASTATIN CALCIUM 40 MG/1
40 TABLET, FILM COATED ORAL DAILY
Qty: 90 TABLET | Refills: 1 | Status: SHIPPED | OUTPATIENT
Start: 2024-08-26

## 2024-08-26 RX ORDER — LOSARTAN POTASSIUM 100 MG/1
100 TABLET ORAL DAILY
Qty: 90 TABLET | Refills: 1 | Status: SHIPPED | OUTPATIENT
Start: 2024-08-26

## 2024-09-23 ENCOUNTER — TELEPHONE (OUTPATIENT)
Dept: FAMILY MEDICINE CLINIC | Facility: CLINIC | Age: 69
End: 2024-09-23
Payer: MEDICARE

## 2024-09-23 ENCOUNTER — OFFICE VISIT (OUTPATIENT)
Dept: FAMILY MEDICINE CLINIC | Facility: CLINIC | Age: 69
End: 2024-09-23
Payer: MEDICARE

## 2024-09-23 VITALS
WEIGHT: 123.6 LBS | HEART RATE: 84 BPM | SYSTOLIC BLOOD PRESSURE: 122 MMHG | DIASTOLIC BLOOD PRESSURE: 76 MMHG | BODY MASS INDEX: 20.59 KG/M2 | HEIGHT: 65 IN | RESPIRATION RATE: 14 BRPM | OXYGEN SATURATION: 98 % | TEMPERATURE: 98 F

## 2024-09-23 DIAGNOSIS — R63.4 UNINTENDED WEIGHT LOSS: ICD-10-CM

## 2024-09-23 DIAGNOSIS — R07.9 CHEST PAIN, UNSPECIFIED TYPE: ICD-10-CM

## 2024-09-23 DIAGNOSIS — R79.9 ABNORMAL FINDING OF BLOOD CHEMISTRY, UNSPECIFIED: ICD-10-CM

## 2024-09-23 DIAGNOSIS — R13.19 OTHER DYSPHAGIA: ICD-10-CM

## 2024-09-23 DIAGNOSIS — I10 ESSENTIAL HYPERTENSION: ICD-10-CM

## 2024-09-23 DIAGNOSIS — K21.9 GASTROESOPHAGEAL REFLUX DISEASE WITHOUT ESOPHAGITIS: ICD-10-CM

## 2024-09-23 DIAGNOSIS — E78.5 HYPERLIPIDEMIA, UNSPECIFIED HYPERLIPIDEMIA TYPE: Primary | ICD-10-CM

## 2024-09-23 PROCEDURE — 93000 ELECTROCARDIOGRAM COMPLETE: CPT | Performed by: FAMILY MEDICINE

## 2024-09-23 PROCEDURE — 3074F SYST BP LT 130 MM HG: CPT | Performed by: FAMILY MEDICINE

## 2024-09-23 PROCEDURE — 3078F DIAST BP <80 MM HG: CPT | Performed by: FAMILY MEDICINE

## 2024-09-23 PROCEDURE — 1160F RVW MEDS BY RX/DR IN RCRD: CPT | Performed by: FAMILY MEDICINE

## 2024-09-23 PROCEDURE — 1126F AMNT PAIN NOTED NONE PRSNT: CPT | Performed by: FAMILY MEDICINE

## 2024-09-23 PROCEDURE — 1159F MED LIST DOCD IN RCRD: CPT | Performed by: FAMILY MEDICINE

## 2024-09-23 PROCEDURE — 99214 OFFICE O/P EST MOD 30 MIN: CPT | Performed by: FAMILY MEDICINE

## 2024-09-23 RX ORDER — OMEPRAZOLE 40 MG/1
40 CAPSULE, DELAYED RELEASE ORAL 2 TIMES DAILY
Qty: 180 CAPSULE | Refills: 3 | Status: SHIPPED | OUTPATIENT
Start: 2024-09-23

## 2024-09-24 LAB
ALBUMIN SERPL-MCNC: 4.7 G/DL (ref 3.9–4.9)
ALP SERPL-CCNC: 65 IU/L (ref 44–121)
ALT SERPL-CCNC: 28 IU/L (ref 0–32)
AST SERPL-CCNC: 29 IU/L (ref 0–40)
BASOPHILS # BLD AUTO: 0.1 X10E3/UL (ref 0–0.2)
BASOPHILS NFR BLD AUTO: 1 %
BILIRUB SERPL-MCNC: 0.4 MG/DL (ref 0–1.2)
BUN SERPL-MCNC: 15 MG/DL (ref 8–27)
BUN/CREAT SERPL: 22 (ref 12–28)
CALCIUM SERPL-MCNC: 10.1 MG/DL (ref 8.7–10.3)
CHLORIDE SERPL-SCNC: 96 MMOL/L (ref 96–106)
CHOLEST SERPL-MCNC: 245 MG/DL (ref 100–199)
CO2 SERPL-SCNC: 23 MMOL/L (ref 20–29)
CREAT SERPL-MCNC: 0.69 MG/DL (ref 0.57–1)
EGFRCR SERPLBLD CKD-EPI 2021: 95 ML/MIN/1.73
EOSINOPHIL # BLD AUTO: 0.2 X10E3/UL (ref 0–0.4)
EOSINOPHIL NFR BLD AUTO: 3 %
ERYTHROCYTE [DISTWIDTH] IN BLOOD BY AUTOMATED COUNT: 12.2 % (ref 11.7–15.4)
ERYTHROCYTE [SEDIMENTATION RATE] IN BLOOD BY WESTERGREN METHOD: 15 MM/HR (ref 0–40)
GLOBULIN SER CALC-MCNC: 2.2 G/DL (ref 1.5–4.5)
GLUCOSE SERPL-MCNC: 81 MG/DL (ref 70–99)
HBA1C MFR BLD: 5 % (ref 4.8–5.6)
HCT VFR BLD AUTO: 38.6 % (ref 34–46.6)
HCV IGG SERPL QL IA: NON REACTIVE
HDLC SERPL-MCNC: 106 MG/DL
HGB BLD-MCNC: 12.3 G/DL (ref 11.1–15.9)
HIV 1+2 AB+HIV1 P24 AG SERPL QL IA: NON REACTIVE
IMM GRANULOCYTES # BLD AUTO: 0 X10E3/UL (ref 0–0.1)
IMM GRANULOCYTES NFR BLD AUTO: 0 %
LDLC SERPL CALC-MCNC: 116 MG/DL (ref 0–99)
LYMPHOCYTES # BLD AUTO: 1.7 X10E3/UL (ref 0.7–3.1)
LYMPHOCYTES NFR BLD AUTO: 24 %
MCH RBC QN AUTO: 30.2 PG (ref 26.6–33)
MCHC RBC AUTO-ENTMCNC: 31.9 G/DL (ref 31.5–35.7)
MCV RBC AUTO: 95 FL (ref 79–97)
MONOCYTES # BLD AUTO: 0.7 X10E3/UL (ref 0.1–0.9)
MONOCYTES NFR BLD AUTO: 10 %
NEUTROPHILS # BLD AUTO: 4.4 X10E3/UL (ref 1.4–7)
NEUTROPHILS NFR BLD AUTO: 62 %
PLATELET # BLD AUTO: 344 X10E3/UL (ref 150–450)
POTASSIUM SERPL-SCNC: 3.6 MMOL/L (ref 3.5–5.2)
PROT SERPL-MCNC: 6.9 G/DL (ref 6–8.5)
RBC # BLD AUTO: 4.07 X10E6/UL (ref 3.77–5.28)
SODIUM SERPL-SCNC: 140 MMOL/L (ref 134–144)
TRIGL SERPL-MCNC: 138 MG/DL (ref 0–149)
TSH SERPL DL<=0.005 MIU/L-ACNC: 0.49 UIU/ML (ref 0.45–4.5)
VLDLC SERPL CALC-MCNC: 23 MG/DL (ref 5–40)
WBC # BLD AUTO: 7 X10E3/UL (ref 3.4–10.8)

## 2024-09-26 ENCOUNTER — TELEPHONE (OUTPATIENT)
Dept: FAMILY MEDICINE CLINIC | Facility: CLINIC | Age: 69
End: 2024-09-26
Payer: MEDICARE

## 2024-09-27 DIAGNOSIS — R63.4 UNINTENDED WEIGHT LOSS: Primary | ICD-10-CM

## 2024-09-30 ENCOUNTER — TELEPHONE (OUTPATIENT)
Dept: FAMILY MEDICINE CLINIC | Facility: CLINIC | Age: 69
End: 2024-09-30
Payer: MEDICARE

## 2024-09-30 NOTE — TELEPHONE ENCOUNTER
Patient has not had rx filled in a year.  Patient will need an appointment to get this medication.  Please schedule.    [facility]

## 2024-09-30 NOTE — TELEPHONE ENCOUNTER
Caller: Karen Gaston    Relationship: Self    Best call back number: [phone]     What medication are you requesting: AMBIEN    What are your current symptoms: CAN'T SLEEP    Have you had these symptoms before:    [x] Yes  [] No    Have you been treated for these symptoms before:   [x] Yes  [] No    If a prescription is needed, what is your preferred pharmacy and phone number: [facility] [phone] - [place] [address] AT [facility] [address] [facility] [phone] [facility] [phone] FX     Additional notes: PATIENT SAW DR. COSBY ON 9/23 AND HE SAID HE WAS GOING TO CALL IN AMBIEN BUT PHARMACY SAID THEY DO NOT HAVE THE PRESCRIPTION.

## 2024-10-01 NOTE — TELEPHONE ENCOUNTER
Patient refused to make appointment & thinks the medication should be filled based on her last visit with Dr Brewer. I tried to explain the reasoning behind the appt need but patient said forget it & hung up

## 2024-10-01 NOTE — TELEPHONE ENCOUNTER
Pt needs a drug screen and controlled substance agreement and will therefor need an appt.  Please schedule.    [facility]

## 2024-10-26 ENCOUNTER — HOSPITAL ENCOUNTER (OUTPATIENT)
Facility: HOSPITAL | Age: 69
Discharge: HOME | End: 2024-10-26
Payer: MEDICARE

## 2024-10-26 DIAGNOSIS — R63.4 UNINTENDED WEIGHT LOSS: ICD-10-CM

## 2024-10-26 PROCEDURE — 74177 CT ABD & PELVIS W/CONTRAST: CPT

## 2024-10-26 PROCEDURE — 25510000001 IOPAMIDOL 61 % SOLUTION: Performed by: FAMILY MEDICINE

## 2024-10-26 PROCEDURE — 0 DIATRIZOATE MEGLUMINE & SODIUM PER 1 ML: Performed by: FAMILY MEDICINE

## 2024-10-26 PROCEDURE — 70491 CT SOFT TISSUE NECK W/DYE: CPT

## 2024-10-26 PROCEDURE — 71260 CT THORAX DX C+: CPT

## 2024-10-26 RX ORDER — DIATRIZOATE MEGLUMINE AND DIATRIZOATE SODIUM 660; 100 MG/ML; MG/ML
30 SOLUTION ORAL; RECTAL
Status: COMPLETED | OUTPATIENT
Start: 2024-10-26 | End: 2024-10-26

## 2024-10-26 RX ORDER — IOPAMIDOL 612 MG/ML
100 INJECTION, SOLUTION INTRAVASCULAR
Status: COMPLETED | OUTPATIENT
Start: 2024-10-26 | End: 2024-10-26

## 2024-10-26 RX ADMIN — DIATRIZOATE MEGLUMINE AND DIATRIZOATE SODIUM 30 ML: 600; 100 SOLUTION ORAL; RECTAL at 08:40

## 2024-10-26 RX ADMIN — IOPAMIDOL 85 ML: 612 INJECTION, SOLUTION INTRAVENOUS at 10:02

## 2024-10-29 ENCOUNTER — TELEPHONE (OUTPATIENT)
Dept: FAMILY MEDICINE CLINIC | Facility: CLINIC | Age: 69
End: 2024-10-29
Payer: MEDICARE

## 2024-10-29 PROBLEM — H11.9 LESION OF CONJUNCTIVA: Status: RESOLVED | Noted: 2017-07-10 | Resolved: 2024-10-29

## 2024-10-29 PROBLEM — J43.8 OTHER EMPHYSEMA: Status: ACTIVE | Noted: 2024-10-29

## 2024-10-29 PROBLEM — Z23 IMMUNIZATION DUE: Status: RESOLVED | Noted: 2021-11-16 | Resolved: 2024-10-29

## 2024-10-29 PROBLEM — Z12.31 VISIT FOR SCREENING MAMMOGRAM: Status: RESOLVED | Noted: 2020-01-14 | Resolved: 2024-10-29

## 2024-10-29 RX ORDER — AZITHROMYCIN 250 MG/1
TABLET, FILM COATED ORAL
Qty: 6 TABLET | Refills: 0 | Status: SHIPPED | OUTPATIENT
Start: 2024-10-29

## 2024-10-29 NOTE — TELEPHONE ENCOUNTER
Caller: Karen Gaston    Relationship: Self    Best call back number:[phone]      Caller requesting test results: PATIENT    What test was performed: CT SCANS    When was the test performed: 10/26/24    Where was the test performed: [facility]    Additional notes: SHE WOULD LIKE TO GO OVER THE RESULTS WITH SOMEONE.  ALSO, SHE WOULD LIKE TO KNOW WHAT DR. COSYB IS GOING TO DO ABOUT ALL THE MUCUS IN HER HEAD.  NOTHING OVER THE COUNTER WORKS

## 2024-10-29 NOTE — TELEPHONE ENCOUNTER
I called and left a detailed message for the patient regarding results and informed an antibiotic was sent in .   20

## 2024-11-21 DIAGNOSIS — I10 ESSENTIAL HYPERTENSION: ICD-10-CM

## 2024-11-21 RX ORDER — POTASSIUM CHLORIDE 1500 MG/1
20 TABLET, EXTENDED RELEASE ORAL DAILY
Qty: 90 TABLET | Refills: 0 | Status: SHIPPED | OUTPATIENT
Start: 2024-11-21

## 2024-11-23 DIAGNOSIS — I10 ESSENTIAL HYPERTENSION: ICD-10-CM

## 2024-11-25 RX ORDER — POTASSIUM CHLORIDE 1500 MG/1
20 TABLET, EXTENDED RELEASE ORAL DAILY
Qty: 90 TABLET | Refills: 0 | OUTPATIENT
Start: 2024-11-25

## 2024-12-14 DIAGNOSIS — R60.9 EDEMA, UNSPECIFIED TYPE: ICD-10-CM

## 2024-12-16 RX ORDER — BUMETANIDE 2 MG/1
2 TABLET ORAL DAILY
Qty: 30 TABLET | Refills: 11 | Status: SHIPPED | OUTPATIENT
Start: 2024-12-16

## 2025-02-10 DIAGNOSIS — E78.5 HYPERLIPIDEMIA, UNSPECIFIED HYPERLIPIDEMIA TYPE: ICD-10-CM

## 2025-02-10 RX ORDER — ATORVASTATIN CALCIUM 40 MG/1
40 TABLET, FILM COATED ORAL DAILY
Qty: 90 TABLET | Refills: 0 | Status: SHIPPED | OUTPATIENT
Start: 2025-02-10

## 2025-02-19 DIAGNOSIS — I10 ESSENTIAL HYPERTENSION: ICD-10-CM

## 2025-02-19 DIAGNOSIS — E78.5 HYPERLIPIDEMIA, UNSPECIFIED HYPERLIPIDEMIA TYPE: ICD-10-CM

## 2025-02-19 RX ORDER — ATORVASTATIN CALCIUM 40 MG/1
40 TABLET, FILM COATED ORAL DAILY
Qty: 90 TABLET | Refills: 0 | OUTPATIENT
Start: 2025-02-19

## 2025-02-19 RX ORDER — LOSARTAN POTASSIUM 100 MG/1
100 TABLET ORAL DAILY
Qty: 90 TABLET | Refills: 1 | Status: SHIPPED | OUTPATIENT
Start: 2025-02-19

## 2025-03-11 DIAGNOSIS — M19.90 ARTHRITIS: ICD-10-CM

## 2025-03-11 RX ORDER — MELOXICAM 15 MG/1
15 TABLET ORAL DAILY
Qty: 90 TABLET | Refills: 0 | Status: SHIPPED | OUTPATIENT
Start: 2025-03-11

## 2025-03-11 NOTE — TELEPHONE ENCOUNTER
LOV                  9/23/24  NOV                  3/20/25  Last refill             6/27/24

## 2025-03-20 ENCOUNTER — OFFICE VISIT (OUTPATIENT)
Dept: FAMILY MEDICINE CLINIC | Facility: CLINIC | Age: 70
End: 2025-03-20
Payer: MEDICARE

## 2025-03-20 VITALS
BODY MASS INDEX: 19.13 KG/M2 | RESPIRATION RATE: 14 BRPM | DIASTOLIC BLOOD PRESSURE: 78 MMHG | WEIGHT: 114.8 LBS | SYSTOLIC BLOOD PRESSURE: 126 MMHG | TEMPERATURE: 97.9 F | HEIGHT: 65 IN

## 2025-03-20 DIAGNOSIS — E07.9 DISORDER OF THYROID GLAND: ICD-10-CM

## 2025-03-20 DIAGNOSIS — Z00.00 MEDICARE ANNUAL WELLNESS VISIT, SUBSEQUENT: Primary | ICD-10-CM

## 2025-03-20 DIAGNOSIS — I10 ESSENTIAL HYPERTENSION: ICD-10-CM

## 2025-03-20 DIAGNOSIS — R63.4 UNINTENDED WEIGHT LOSS: ICD-10-CM

## 2025-03-20 DIAGNOSIS — E78.2 MIXED HYPERLIPIDEMIA: ICD-10-CM

## 2025-03-20 RX ORDER — DICYCLOMINE HCL 20 MG
20 TABLET ORAL EVERY 6 HOURS
COMMUNITY
Start: 2025-01-21

## 2025-03-20 NOTE — PROGRESS NOTES
Subjective   The ABCs of the Annual Wellness Visit  Medicare Wellness Visit      Karen Gaston is a 69 y.o. patient who presents for a Medicare Wellness Visit.    The following portions of the patient's history were reviewed and   updated as appropriate: allergies, current medications, past family history, past medical history, past social history, past surgical history, and problem list.    Compared to one year ago, the patient's physical   health is the same.  Compared to one year ago, the patient's mental   health is the same.    Recent Hospitalizations:  She was not admitted to the hospital during the last year.     Current Medical Providers:  Patient Care Team:  Debra Brewer MD as PCP - General (Family Medicine)    Outpatient Medications Prior to Visit   Medication Sig Dispense Refill   • amLODIPine (NORVASC) 5 MG tablet Take 1 tablet by mouth Daily. 90 tablet 3   • ammonium lactate (LAC-HYDRIN) 12 % lotion Apply  topically to the appropriate area as directed As Needed for Dry Skin. 396 g 3   • atorvastatin (LIPITOR) 40 MG tablet TAKE 1 TABLET BY MOUTH DAILY 90 tablet 0   • bumetanide (BUMEX) 2 MG tablet TAKE 1 TABLET BY MOUTH DAILY 30 tablet 11   • cetirizine (zyrTEC) 10 MG tablet Take 1 tablet by mouth Daily. 90 tablet 3   • dicyclomine (BENTYL) 20 MG tablet Take 1 tablet by mouth Every 6 (Six) Hours.     • escitalopram (Lexapro) 20 MG tablet Take 1 tablet by mouth Daily for 360 days. 90 tablet 3   • losartan (COZAAR) 100 MG tablet TAKE 1 TABLET BY MOUTH DAILY 90 tablet 1   • meloxicam (MOBIC) 15 MG tablet TAKE 1 TABLET BY MOUTH DAILY 90 tablet 0   • multivitamin (THERAGRAN) tablet tablet Take  by mouth Daily.     • omeprazole (priLOSEC) 40 MG capsule Take 1 capsule by mouth 2 (Two) Times a Day. 180 capsule 3   • potassium chloride (Klor-Con M20) 20 MEQ CR tablet TAKE 1 TABLET BY MOUTH DAILY 90 tablet 0   • vitamin E 100 UNIT capsule Take 1 capsule by mouth Daily. (Patient not taking: Reported  "on 3/20/2025)     • azithromycin (Zithromax) 250 MG tablet Take 2 tablets the first day, then 1 tablet daily for 4 days. (Patient not taking: Reported on 3/20/2025) 6 tablet 0     No facility-administered medications prior to visit.     No opioid medication identified on active medication list. I have reviewed chart for other potential  high risk medication/s and harmful drug interactions in the elderly.      Aspirin is not on active medication list.  Aspirin use is not indicated based on review of current medical condition/s. Risk of harm outweighs potential benefits.  .    Patient Active Problem List   Diagnosis   • Bulge of lumbar disc without myelopathy   • Disorder of thyroid gland   • Cervical osteoarthritis   • Depressive disorder   • Esophageal reflux   • Fatigue   • Flajani disease   • Gonalgia   • Hyperlipidemia   • Essential hypertension   • Influenza vaccination declined   • Injury of head   • Insomnia   • Lumbar radiculopathy   • Lump of breast   • Seasonal allergies   • High risk medication use   • Allergic rhinitis due to pollen   • Postmenopausal   • Lumbar pain   • Hip pain, chronic, right   • Arthritis   • Compression fracture of T12 vertebra with delayed healing   • Neck pain   • Double vision   • Graves' disease   • Other dysphagia   • Unintended weight loss   • Other emphysema   • Medicare annual wellness visit, subsequent     Advance Care Planning Advance Directive is not on file.  ACP discussion was held with the patient during this visit. Patient has an advance directive (not in EMR), copy requested.            Objective   Vitals:    03/20/25 1007   BP: 126/78   BP Location: Left arm   Patient Position: Sitting   Resp: 14   Temp: 97.9 °F (36.6 °C)   Weight: 52.1 kg (114 lb 12.8 oz)   Height: 165.1 cm (65\")   PainSc: 2    PainLoc: Abdomen       Estimated body mass index is 19.1 kg/m² as calculated from the following:    Height as of this encounter: 165.1 cm (65\").    Weight as of this " encounter: 52.1 kg (114 lb 12.8 oz).    BMI is within normal parameters. No other follow-up for BMI required.           Does the patient have evidence of cognitive impairment? No                                                                                                Health  Risk Assessment    Smoking Status:  Social History     Tobacco Use   Smoking Status Light Smoker   • Current packs/day: 0.00   • Average packs/day: 0.3 packs/day for 0.6 years (0.1 ttl pk-yrs)   • Types: Cigarettes   • Start date: 2020   • Last attempt to quit: 2020   • Years since quittin.2   • Passive exposure: Current   Smokeless Tobacco Never   Tobacco Comments    I smoke sometimes when I am stressed out     Alcohol Consumption:  Social History     Substance and Sexual Activity   Alcohol Use Yes   • Alcohol/week: 9.0 standard drinks of alcohol   • Types: 2 Glasses of wine, 3 Cans of beer, 4 Shots of liquor per week    Comment: I drink when I get home from work in the evenings 1 and a ha       Fall Risk Screen  STEADI Fall Risk Assessment was completed, and patient is at LOW risk for falls.Assessment completed on:3/20/2025    Depression Screening   Little interest or pleasure in doing things? Not at all   Feeling down, depressed, or hopeless? Not at all   PHQ-2 Total Score 0      Health Habits and Functional and Cognitive Screening:      3/20/2025    10:10 AM   Functional & Cognitive Status   Do you have difficulty preparing food and eating? No   Do you have difficulty bathing yourself, getting dressed or grooming yourself? No   Do you have difficulty using the toilet? No   Do you have difficulty moving around from place to place? No   Do you have trouble with steps or getting out of a bed or a chair? No   Current Diet Well Balanced Diet   Dental Exam Up to date   Eye Exam Not up to date   Exercise (times per week) 7 times per week   Current Exercises Include Walking   Do you need help using the phone?  No   Are you deaf  or do you have serious difficulty hearing?  No   Do you need help to go to places out of walking distance? No   Do you need help shopping? No   Do you need help preparing meals?  No   Do you need help with housework?  No   Do you need help with laundry? No   Do you need help taking your medications? No   Do you need help managing money? No   Do you ever drive or ride in a car without wearing a seat belt? No   Have you felt unusual stress, anger or loneliness in the last month? No   Who do you live with? Other   If you need help, do you have trouble finding someone available to you? No   Have you been bothered in the last four weeks by sexual problems? No   Do you have difficulty concentrating, remembering or making decisions? No           Age-appropriate Screening Schedule:  Refer to the list below for future screening recommendations based on patient's age, sex and/or medical conditions. Orders for these recommended tests are listed in the plan section. The patient has been provided with a written plan.    Health Maintenance List  Health Maintenance   Topic Date Due   • COVID-19 Vaccine (6 - 2024-25 season) 09/01/2024   • LIPID PANEL  09/23/2025   • MAMMOGRAM  02/08/2026   • ANNUAL WELLNESS VISIT  03/20/2026   • DXA SCAN  07/19/2026   • TDAP/TD VACCINES (2 - Td or Tdap) 05/28/2032   • COLORECTAL CANCER SCREENING  02/04/2035   • HEPATITIS C SCREENING  Completed   • INFLUENZA VACCINE  Completed   • Pneumococcal Vaccine 50+  Completed   • PAP SMEAR  Discontinued   • ZOSTER VACCINE  Discontinued                                                                                                                                                CMS Preventative Services Quick Reference  Risk Factors Identified During Encounter  None Identified    The above risks/problems have been discussed with the patient.  Pertinent information has been shared with the patient in the After Visit Summary.  An After Visit Summary and PPPS were  "made available to the patient.    Follow Up:   Next Medicare Wellness visit to be scheduled in 1 year.     Work on living will.  Continue diet and exercise.      Additional E&M Note during same encounter follows:  Patient has additional, significant, and separately identifiable condition(s)/problem(s) that require work above and beyond the Medicare Wellness Visit     Chief Complaint  Medicare Wellness-subsequent    Subjective   HPI  Karen is also being seen today for additional medical problem/s.  Pt presents today for refills, labs and  HLD- not controlled and is on med for this.    HTN- no HA  GERD- on med bid and still has some gerd occasionally;  also on bentyl  Needs thyroid labs.                Objective   Vital Signs:  /78 (BP Location: Left arm, Patient Position: Sitting)   Temp 97.9 °F (36.6 °C)   Resp 14   Ht 165.1 cm (65\")   Wt 52.1 kg (114 lb 12.8 oz)   BMI 19.10 kg/m²   Physical Exam  Vitals and nursing note reviewed.   Constitutional:       Appearance: Normal appearance. She is well-developed.   HENT:      Head: Normocephalic and atraumatic.   Cardiovascular:      Rate and Rhythm: Normal rate and regular rhythm.      Heart sounds: Normal heart sounds. No murmur heard.  Pulmonary:      Effort: Pulmonary effort is normal. No respiratory distress.      Breath sounds: Normal breath sounds. No stridor. No wheezing or rhonchi.   Musculoskeletal:      Cervical back: Neck supple.   Lymphadenopathy:      Cervical: No cervical adenopathy.   Neurological:      General: No focal deficit present.      Mental Status: She is alert and oriented to person, place, and time. She is not disoriented.   Psychiatric:         Mood and Affect: Mood normal.         Behavior: Behavior normal.                  Assessment and Plan      Medicare annual wellness visit, subsequent         Mixed hyperlipidemia       Orders:  •  Lipid Panel    Essential hypertension      Orders:  •  Comprehensive Metabolic Panel    Disorder " of thyroid gland    Orders:  •  TSH    Unintended weight loss                 Follow Up   Return in about 6 months (around 9/20/2025).  Patient was given instructions and counseling regarding her condition or for health maintenance advice. Please see specific information pulled into the AVS if appropriate.    Labs and refills.    Work on diet and exercise.  Will adjust HLD med accordingly for uncontrolled lipids  Continue current med for gerd  and diarrhea.    Use protein shake twice a day for wt loss and follow up with GI doctor.

## 2025-03-21 ENCOUNTER — RESULTS FOLLOW-UP (OUTPATIENT)
Dept: FAMILY MEDICINE CLINIC | Facility: CLINIC | Age: 70
End: 2025-03-21
Payer: MEDICARE

## 2025-03-21 DIAGNOSIS — M19.90 ARTHRITIS: ICD-10-CM

## 2025-03-21 LAB
ALBUMIN SERPL-MCNC: 4.5 G/DL (ref 3.5–5.2)
ALBUMIN/GLOB SERPL: 1.7 G/DL
ALP SERPL-CCNC: 77 U/L (ref 39–117)
ALT SERPL-CCNC: 24 U/L (ref 1–33)
AST SERPL-CCNC: 28 U/L (ref 1–32)
BILIRUB SERPL-MCNC: 0.4 MG/DL (ref 0–1.2)
BUN SERPL-MCNC: 16 MG/DL (ref 8–23)
BUN/CREAT SERPL: 24.2 (ref 7–25)
CALCIUM SERPL-MCNC: 10.1 MG/DL (ref 8.6–10.5)
CHLORIDE SERPL-SCNC: 98 MMOL/L (ref 98–107)
CHOLEST SERPL-MCNC: 241 MG/DL (ref 0–200)
CO2 SERPL-SCNC: 28 MMOL/L (ref 22–29)
CREAT SERPL-MCNC: 0.66 MG/DL (ref 0.57–1)
EGFRCR SERPLBLD CKD-EPI 2021: 96.3 ML/MIN/1.73
GLOBULIN SER CALC-MCNC: 2.6 GM/DL
GLUCOSE SERPL-MCNC: 95 MG/DL (ref 65–99)
HDLC SERPL-MCNC: 106 MG/DL (ref 40–60)
LDLC SERPL CALC-MCNC: 121 MG/DL (ref 0–100)
POTASSIUM SERPL-SCNC: 3.9 MMOL/L (ref 3.5–5.2)
PROT SERPL-MCNC: 7.1 G/DL (ref 6–8.5)
SODIUM SERPL-SCNC: 138 MMOL/L (ref 136–145)
TRIGL SERPL-MCNC: 85 MG/DL (ref 0–150)
TSH SERPL DL<=0.005 MIU/L-ACNC: 0.32 UIU/ML (ref 0.27–4.2)
VLDLC SERPL CALC-MCNC: 14 MG/DL (ref 5–40)

## 2025-03-21 RX ORDER — MELOXICAM 15 MG/1
15 TABLET ORAL DAILY
Qty: 90 TABLET | Refills: 0 | OUTPATIENT
Start: 2025-03-21

## 2025-03-22 DIAGNOSIS — I10 ESSENTIAL HYPERTENSION: ICD-10-CM

## 2025-03-24 ENCOUNTER — TELEPHONE (OUTPATIENT)
Dept: FAMILY MEDICINE CLINIC | Facility: CLINIC | Age: 70
End: 2025-03-24
Payer: MEDICARE

## 2025-03-24 RX ORDER — POTASSIUM CHLORIDE 1500 MG/1
20 TABLET, EXTENDED RELEASE ORAL DAILY
Qty: 90 TABLET | Refills: 0 | Status: SHIPPED | OUTPATIENT
Start: 2025-03-24

## 2025-03-28 DIAGNOSIS — J30.1 ALLERGIC RHINITIS DUE TO POLLEN, UNSPECIFIED SEASONALITY: ICD-10-CM

## 2025-03-28 RX ORDER — CETIRIZINE HYDROCHLORIDE 10 MG/1
10 TABLET ORAL DAILY
Qty: 90 TABLET | Refills: 3 | Status: SHIPPED | OUTPATIENT
Start: 2025-03-28

## 2025-05-12 DIAGNOSIS — E78.5 HYPERLIPIDEMIA, UNSPECIFIED HYPERLIPIDEMIA TYPE: ICD-10-CM

## 2025-05-12 RX ORDER — ATORVASTATIN CALCIUM 40 MG/1
40 TABLET, FILM COATED ORAL DAILY
Qty: 90 TABLET | Refills: 0 | Status: SHIPPED | OUTPATIENT
Start: 2025-05-12

## 2025-07-06 DIAGNOSIS — F41.9 ANXIETY: ICD-10-CM

## 2025-07-07 RX ORDER — ESCITALOPRAM OXALATE 20 MG/1
20 TABLET ORAL DAILY
Qty: 90 TABLET | Refills: 3 | Status: SHIPPED | OUTPATIENT
Start: 2025-07-07

## 2025-08-12 DIAGNOSIS — I10 ESSENTIAL HYPERTENSION: ICD-10-CM

## 2025-08-12 DIAGNOSIS — E78.5 HYPERLIPIDEMIA, UNSPECIFIED HYPERLIPIDEMIA TYPE: ICD-10-CM

## 2025-08-12 RX ORDER — ATORVASTATIN CALCIUM 40 MG/1
40 TABLET, FILM COATED ORAL DAILY
Qty: 90 TABLET | Refills: 0 | Status: SHIPPED | OUTPATIENT
Start: 2025-08-12

## 2025-08-12 RX ORDER — LOSARTAN POTASSIUM 100 MG/1
100 TABLET ORAL DAILY
Qty: 90 TABLET | Refills: 0 | Status: SHIPPED | OUTPATIENT
Start: 2025-08-12

## 2025-08-17 DIAGNOSIS — I10 ESSENTIAL HYPERTENSION: ICD-10-CM

## 2025-08-17 DIAGNOSIS — E78.5 HYPERLIPIDEMIA, UNSPECIFIED HYPERLIPIDEMIA TYPE: ICD-10-CM

## 2025-08-18 RX ORDER — ATORVASTATIN CALCIUM 40 MG/1
40 TABLET, FILM COATED ORAL DAILY
Qty: 90 TABLET | Refills: 0 | OUTPATIENT
Start: 2025-08-18

## 2025-08-18 RX ORDER — LOSARTAN POTASSIUM 100 MG/1
100 TABLET ORAL DAILY
Qty: 90 TABLET | Refills: 0 | OUTPATIENT
Start: 2025-08-18